# Patient Record
Sex: MALE | Race: WHITE | NOT HISPANIC OR LATINO | Employment: OTHER | URBAN - METROPOLITAN AREA
[De-identification: names, ages, dates, MRNs, and addresses within clinical notes are randomized per-mention and may not be internally consistent; named-entity substitution may affect disease eponyms.]

---

## 2017-12-07 LAB
LDLC SERPL CALC-MCNC: 110 MG/DL (ref 0–99)
VLDLC SERPL CALC-MCNC: 17 MG/DL (ref 5–40)

## 2018-01-09 ENCOUNTER — HISTORICAL (OUTPATIENT)
Dept: RADIOLOGY | Facility: HOSPITAL | Age: 58
End: 2018-01-09

## 2018-01-09 LAB
ALBUMIN SERPL-MCNC: 3.7 GM/DL (ref 3.4–5)
ALP SERPL-CCNC: 50 UNIT/L (ref 46–116)
ALT SERPL-CCNC: 50 UNIT/L (ref 12–78)
AST SERPL-CCNC: 21 UNIT/L (ref 15–37)
BILIRUB SERPL-MCNC: 0.5 MG/DL (ref 0.2–1)
BILIRUBIN DIRECT+TOT PNL SERPL-MCNC: 0.12 MG/DL (ref 0–0.2)
BILIRUBIN DIRECT+TOT PNL SERPL-MCNC: 0.41 MG/DL (ref 0–0.8)
CHOLEST SERPL-MCNC: 165 MG/DL (ref 0–200)
CHOLEST/HDLC SERPL: 3.4 {RATIO} (ref 0–5)
HDLC SERPL-MCNC: 48 MG/DL (ref 40–60)
LDLC SERPL CALC-MCNC: 97 MG/DL (ref 0–129)
PROT SERPL-MCNC: 7.4 GM/DL (ref 6.4–8.2)
TRIGL SERPL-MCNC: 100 MG/DL
VLDLC SERPL CALC-MCNC: 20 MG/DL

## 2018-04-16 LAB
CHOLEST SERPL-MCNC: 206 MG/DL
HDLC SERPL-MCNC: 77 MG/DL (ref 35–60)
LDLC SERPL CALC-MCNC: 115 MG/DL
TRIGL SERPL-MCNC: 68 MG/DL (ref 30–150)

## 2018-12-10 ENCOUNTER — HISTORICAL (OUTPATIENT)
Dept: ADMINISTRATIVE | Facility: HOSPITAL | Age: 58
End: 2018-12-10

## 2018-12-10 LAB
ALBUMIN SERPL-MCNC: 4.6 G/DL (ref 3.6–4.8)
ALBUMIN/GLOB SERPL: 1.5 {RATIO} (ref 1.2–2.2)
ALP SERPL-CCNC: 57 IU/L (ref 39–117)
ALT SERPL-CCNC: 21 IU/L (ref 0–44)
AST SERPL-CCNC: 19 IU/L (ref 0–40)
BASOPHILS # BLD AUTO: 0 X10E3/UL (ref 0–0.2)
BASOPHILS NFR BLD AUTO: 1 %
BILIRUB SERPL-MCNC: 0.4 MG/DL (ref 0–1.2)
BUN SERPL-MCNC: 14 MG/DL (ref 8–27)
CALCIUM SERPL-MCNC: 10.1 MG/DL (ref 8.6–10.2)
CHLORIDE SERPL-SCNC: 99 MMOL/L (ref 96–106)
CHOLEST SERPL-MCNC: 206 MG/DL (ref 100–199)
CHOLEST/HDLC SERPL: 2.6 RATIO (ref 0–5)
CO2 SERPL-SCNC: 28 MMOL/L (ref 20–29)
CREAT SERPL-MCNC: 0.97 MG/DL (ref 0.76–1.27)
CREAT/UREA NIT SERPL: 14 (ref 10–24)
EOSINOPHIL # BLD AUTO: 0.2 X10E3/UL (ref 0–0.4)
EOSINOPHIL NFR BLD AUTO: 3 %
ERYTHROCYTE [DISTWIDTH] IN BLOOD BY AUTOMATED COUNT: 12.9 % (ref 12.3–15.4)
GLOBULIN SER-MCNC: 3.1 G/DL (ref 1.5–4.5)
GLUCOSE SERPL-MCNC: 100 MG/DL (ref 65–99)
HCT VFR BLD AUTO: 45.7 % (ref 37.5–51)
HDLC SERPL-MCNC: 80 MG/DL
HGB BLD-MCNC: 15.2 G/DL (ref 13–17.7)
LDLC SERPL CALC-MCNC: 105 MG/DL (ref 0–99)
LYMPHOCYTES # BLD AUTO: 1.5 X10E3/UL (ref 0.7–3.1)
LYMPHOCYTES NFR BLD AUTO: 26 %
MCH RBC QN AUTO: 32.8 PG (ref 26.6–33)
MCHC RBC AUTO-ENTMCNC: 33.3 G/DL (ref 31.5–35.7)
MCV RBC AUTO: 99 FL (ref 79–97)
MONOCYTES # BLD AUTO: 0.5 X10E3/UL (ref 0.1–0.9)
MONOCYTES NFR BLD AUTO: 10 %
NEUTROPHILS # BLD AUTO: 3.3 X10E3/UL (ref 1.4–7)
NEUTROPHILS NFR BLD AUTO: 60 %
PLATELET # BLD AUTO: 214 X10E3/UL (ref 150–379)
POTASSIUM SERPL-SCNC: 4.6 MMOL/L (ref 3.5–5.2)
PROT SERPL-MCNC: 7.7 G/DL (ref 6–8.5)
PSA SERPL-MCNC: <0.1 NG/ML (ref 0–4)
RBC # BLD AUTO: 4.64 X10(6)/MCL (ref 4.14–5.8)
SODIUM SERPL-SCNC: 143 MMOL/L (ref 134–144)
TRIGL SERPL-MCNC: 103 MG/DL (ref 0–149)
TSH SERPL-ACNC: 1.77 MIU/ML (ref 0.45–4.5)
VLDLC SERPL CALC-MCNC: 21 MG/DL (ref 5–40)
WBC # SPEC AUTO: 5.5 X10E3/UL (ref 3.4–10.8)

## 2019-12-10 ENCOUNTER — HISTORICAL (OUTPATIENT)
Dept: ADMINISTRATIVE | Facility: HOSPITAL | Age: 59
End: 2019-12-10

## 2019-12-10 LAB
ALBUMIN SERPL-MCNC: 4.3 G/DL (ref 3.6–4.8)
ALBUMIN/GLOB SERPL: 1.5 {RATIO} (ref 1.2–2.2)
ALP SERPL-CCNC: 58 IU/L (ref 39–117)
ALT SERPL-CCNC: 19 IU/L (ref 0–44)
AST SERPL-CCNC: 18 IU/L (ref 0–40)
BASOPHILS # BLD AUTO: 0.1 X10E3/UL (ref 0–0.2)
BASOPHILS NFR BLD AUTO: 1 %
BILIRUB SERPL-MCNC: 0.3 MG/DL (ref 0–1.2)
BUN SERPL-MCNC: 14 MG/DL (ref 8–27)
CALCIUM SERPL-MCNC: 9.3 MG/DL (ref 8.6–10.2)
CHLORIDE SERPL-SCNC: 103 MMOL/L (ref 96–106)
CHOLEST SERPL-MCNC: 202 MG/DL (ref 100–199)
CHOLEST/HDLC SERPL: 2.7 RATIO (ref 0–5)
CO2 SERPL-SCNC: 26 MMOL/L (ref 20–29)
CREAT SERPL-MCNC: 1.24 MG/DL (ref 0.76–1.27)
CREAT/UREA NIT SERPL: 11 (ref 10–24)
EOSINOPHIL # BLD AUTO: 0.3 X10E3/UL (ref 0–0.4)
EOSINOPHIL NFR BLD AUTO: 5 %
ERYTHROCYTE [DISTWIDTH] IN BLOOD BY AUTOMATED COUNT: 12.6 % (ref 12.3–15.4)
GLOBULIN SER-MCNC: 2.9 G/DL (ref 1.5–4.5)
GLUCOSE SERPL-MCNC: NORMAL MG/DL
HCT VFR BLD AUTO: 44.1 % (ref 37.5–51)
HDLC SERPL-MCNC: 75 MG/DL
HGB BLD-MCNC: 14.4 G/DL (ref 13–17.7)
LDLC SERPL CALC-MCNC: 99 MG/DL (ref 0–99)
LYMPHOCYTES # BLD AUTO: 1.3 X10E3/UL (ref 0.7–3.1)
LYMPHOCYTES NFR BLD AUTO: 22 %
MCH RBC QN AUTO: 32.1 PG (ref 26.6–33)
MCHC RBC AUTO-ENTMCNC: 32.7 G/DL (ref 31.5–35.7)
MCV RBC AUTO: 98 FL (ref 79–97)
MONOCYTES # BLD AUTO: 0.7 X10E3/UL (ref 0.1–0.9)
MONOCYTES NFR BLD AUTO: 12 %
NEUTROPHILS # BLD AUTO: 3.5 X10E3/UL (ref 1.4–7)
NEUTROPHILS NFR BLD AUTO: 60 %
PLATELET # BLD AUTO: 187 X10E3/UL (ref 150–450)
POTASSIUM SERPL-SCNC: NORMAL MMOL/L
PROT SERPL-MCNC: 7.2 G/DL (ref 6–8.5)
RBC # BLD AUTO: 4.49 X10(6)/MCL (ref 4.14–5.8)
SODIUM SERPL-SCNC: 142 MMOL/L (ref 134–144)
TRIGL SERPL-MCNC: 141 MG/DL (ref 0–149)
TSH SERPL-ACNC: 1.75 MIU/ML (ref 0.45–4.5)
VLDLC SERPL CALC-MCNC: 28 MG/DL (ref 5–40)
WBC # SPEC AUTO: 5.9 X10E3/UL (ref 3.4–10.8)

## 2020-09-17 LAB — CRC RECOMMENDATION EXT: NORMAL

## 2020-12-28 ENCOUNTER — HISTORICAL (OUTPATIENT)
Dept: LAB | Facility: HOSPITAL | Age: 60
End: 2020-12-28

## 2020-12-29 LAB
ABS NEUT (OLG): 2.27 X10(3)/MCL (ref 2.1–9.2)
ALBUMIN SERPL-MCNC: 3.8 GM/DL (ref 3.4–4.8)
ALBUMIN/GLOB SERPL: 1.5 RATIO (ref 1.1–2)
ALP SERPL-CCNC: 48 UNIT/L (ref 40–150)
ALT SERPL-CCNC: 24 UNIT/L (ref 0–55)
AST SERPL-CCNC: 18 UNIT/L (ref 5–34)
BASOPHILS # BLD AUTO: 0 X10(3)/MCL (ref 0–0.2)
BASOPHILS NFR BLD AUTO: 1 %
BILIRUB SERPL-MCNC: 0.7 MG/DL
BILIRUBIN DIRECT+TOT PNL SERPL-MCNC: 0.2 MG/DL (ref 0–0.5)
BILIRUBIN DIRECT+TOT PNL SERPL-MCNC: 0.5 MG/DL (ref 0–0.8)
BUN SERPL-MCNC: 12.8 MG/DL (ref 8.4–25.7)
CALCIUM SERPL-MCNC: 9.1 MG/DL (ref 8.8–10)
CHLORIDE SERPL-SCNC: 107 MMOL/L (ref 98–107)
CHOLEST SERPL-MCNC: 162 MG/DL
CHOLEST/HDLC SERPL: 3 {RATIO} (ref 0–5)
CO2 SERPL-SCNC: 29 MMOL/L (ref 23–31)
CREAT SERPL-MCNC: 1.16 MG/DL (ref 0.73–1.18)
EOSINOPHIL # BLD AUTO: 0.4 X10(3)/MCL (ref 0–0.9)
EOSINOPHIL NFR BLD AUTO: 8 %
ERYTHROCYTE [DISTWIDTH] IN BLOOD BY AUTOMATED COUNT: 11.7 % (ref 11.5–17)
GLOBULIN SER-MCNC: 2.6 GM/DL (ref 2.4–3.5)
GLUCOSE SERPL-MCNC: 94 MG/DL (ref 82–115)
HCT VFR BLD AUTO: 43.2 % (ref 42–52)
HDLC SERPL-MCNC: 48 MG/DL (ref 35–60)
HGB BLD-MCNC: 14.3 GM/DL (ref 14–18)
IMM GRANULOCYTES # BLD AUTO: 0.01 % (ref 0–0.02)
IMM GRANULOCYTES NFR BLD AUTO: 0.2 % (ref 0–0.43)
LDLC SERPL CALC-MCNC: 97 MG/DL (ref 50–140)
LYMPHOCYTES # BLD AUTO: 1.1 X10(3)/MCL (ref 0.6–4.6)
LYMPHOCYTES NFR BLD AUTO: 26 %
MCH RBC QN AUTO: 32.3 PG (ref 27–31)
MCHC RBC AUTO-ENTMCNC: 33.1 GM/DL (ref 33–36)
MCV RBC AUTO: 97.5 FL (ref 80–94)
MONOCYTES # BLD AUTO: 0.5 X10(3)/MCL (ref 0.1–1.3)
MONOCYTES NFR BLD AUTO: 12 %
NEUTROPHILS # BLD AUTO: 2.27 X10(3)/MCL (ref 1.4–7.9)
NEUTROPHILS NFR BLD AUTO: 53 %
PLATELET # BLD AUTO: 158 X10(3)/MCL (ref 130–400)
PMV BLD AUTO: 9.6 FL (ref 9.4–12.4)
POTASSIUM SERPL-SCNC: 5.1 MMOL/L (ref 3.5–5.1)
PROT SERPL-MCNC: 6.4 GM/DL (ref 5.8–7.6)
PSA SERPL-MCNC: <0.02 NG/ML
RBC # BLD AUTO: 4.43 X10(6)/MCL (ref 4.7–6.1)
SODIUM SERPL-SCNC: 143 MMOL/L (ref 136–145)
TRIGL SERPL-MCNC: 84 MG/DL (ref 34–140)
TSH SERPL-ACNC: 1.08 UIU/ML (ref 0.35–4.94)
VLDLC SERPL CALC-MCNC: 17 MG/DL
WBC # SPEC AUTO: 4.3 X10(3)/MCL (ref 4.5–11.5)

## 2022-01-14 ENCOUNTER — HISTORICAL (OUTPATIENT)
Dept: LAB | Facility: HOSPITAL | Age: 62
End: 2022-01-14

## 2022-01-14 LAB
ABS NEUT (OLG): 2.15 X10(3)/MCL (ref 2.1–9.2)
ALBUMIN SERPL-MCNC: 3.7 GM/DL (ref 3.4–4.8)
ALBUMIN/GLOB SERPL: 1.2 RATIO (ref 1.1–2)
ALP SERPL-CCNC: 47 UNIT/L (ref 40–150)
ALT SERPL-CCNC: 25 UNIT/L (ref 0–55)
AST SERPL-CCNC: 19 UNIT/L (ref 5–34)
BASOPHILS # BLD AUTO: 0 X10(3)/MCL (ref 0–0.2)
BASOPHILS NFR BLD AUTO: 1 %
BILIRUB SERPL-MCNC: 0.6 MG/DL
BILIRUBIN DIRECT+TOT PNL SERPL-MCNC: 0.2 MG/DL (ref 0–0.5)
BILIRUBIN DIRECT+TOT PNL SERPL-MCNC: 0.4 MG/DL (ref 0–0.8)
BUN SERPL-MCNC: 13.4 MG/DL (ref 8.4–25.7)
CALCIUM SERPL-MCNC: 9.1 MG/DL (ref 8.7–10.5)
CHLORIDE SERPL-SCNC: 106 MMOL/L (ref 98–107)
CHOLEST SERPL-MCNC: 170 MG/DL
CHOLEST/HDLC SERPL: 3 {RATIO} (ref 0–5)
CO2 SERPL-SCNC: 27 MMOL/L (ref 23–31)
CREAT SERPL-MCNC: 0.85 MG/DL (ref 0.73–1.18)
EOSINOPHIL # BLD AUTO: 0.3 X10(3)/MCL (ref 0–0.9)
EOSINOPHIL NFR BLD AUTO: 7 %
ERYTHROCYTE [DISTWIDTH] IN BLOOD BY AUTOMATED COUNT: 12 % (ref 11.5–17)
GLOBULIN SER-MCNC: 3 GM/DL (ref 2.4–3.5)
GLUCOSE SERPL-MCNC: 80 MG/DL (ref 82–115)
HCT VFR BLD AUTO: 39.3 % (ref 42–52)
HDLC SERPL-MCNC: 50 MG/DL (ref 35–60)
HGB BLD-MCNC: 13.4 GM/DL (ref 14–18)
LDLC SERPL CALC-MCNC: 107 MG/DL (ref 50–140)
LYMPHOCYTES # BLD AUTO: 1.3 X10(3)/MCL (ref 0.6–4.6)
LYMPHOCYTES NFR BLD AUTO: 31 %
MCH RBC QN AUTO: 31.3 PG (ref 27–31)
MCHC RBC AUTO-ENTMCNC: 34.1 GM/DL (ref 33–36)
MCV RBC AUTO: 91.8 FL (ref 80–94)
MONOCYTES # BLD AUTO: 0.5 X10(3)/MCL (ref 0.1–1.3)
MONOCYTES NFR BLD AUTO: 12 %
NEUTROPHILS # BLD AUTO: 2.15 X10(3)/MCL (ref 1.4–7.9)
NEUTROPHILS NFR BLD AUTO: 50 %
PLATELET # BLD AUTO: 173 X10(3)/MCL (ref 130–400)
PMV BLD AUTO: 9.8 FL (ref 9.4–12.4)
POTASSIUM SERPL-SCNC: 4.3 MMOL/L (ref 3.5–5.1)
PROT SERPL-MCNC: 6.7 GM/DL (ref 5.8–7.6)
RBC # BLD AUTO: 4.28 X10(6)/MCL (ref 4.7–6.1)
SODIUM SERPL-SCNC: 142 MMOL/L (ref 136–145)
TRIGL SERPL-MCNC: 67 MG/DL (ref 34–140)
TSH SERPL-ACNC: 1.82 UIU/ML (ref 0.35–4.94)
VLDLC SERPL CALC-MCNC: 13 MG/DL
WBC # SPEC AUTO: 4.3 X10(3)/MCL (ref 4.5–11.5)

## 2022-01-21 ENCOUNTER — HISTORICAL (OUTPATIENT)
Dept: LAB | Facility: HOSPITAL | Age: 62
End: 2022-01-21

## 2022-01-21 LAB
ABS NEUT (OLG): 2.14 X10(3)/MCL (ref 2.1–9.2)
BASOPHILS # BLD AUTO: 0 X10(3)/MCL (ref 0–0.2)
BASOPHILS NFR BLD AUTO: 0 %
EOSINOPHIL # BLD AUTO: 0.2 X10(3)/MCL (ref 0–0.9)
EOSINOPHIL NFR BLD AUTO: 4 %
ERYTHROCYTE [DISTWIDTH] IN BLOOD BY AUTOMATED COUNT: 12.5 % (ref 11.5–17)
FOLATE SERPL-MCNC: 17.4 NG/ML (ref 7–31.4)
HCT VFR BLD AUTO: 42.9 % (ref 42–52)
HEMOCCULT SP1 STL QL: NEGATIVE
HGB BLD-MCNC: 14.3 GM/DL (ref 14–18)
IRON SATN MFR SERPL: 44 % (ref 20–50)
IRON SERPL-MCNC: 118 UG/DL (ref 65–175)
LYMPHOCYTES # BLD AUTO: 1.2 X10(3)/MCL (ref 0.6–4.6)
LYMPHOCYTES NFR BLD AUTO: 30 %
MCH RBC QN AUTO: 31.2 PG (ref 27–31)
MCHC RBC AUTO-ENTMCNC: 33.3 GM/DL (ref 33–36)
MCV RBC AUTO: 93.7 FL (ref 80–94)
MONOCYTES # BLD AUTO: 0.5 X10(3)/MCL (ref 0.1–1.3)
MONOCYTES NFR BLD AUTO: 13 %
NEUTROPHILS # BLD AUTO: 2.14 X10(3)/MCL (ref 1.4–7.9)
NEUTROPHILS NFR BLD AUTO: 53 %
PLATELET # BLD AUTO: 183 X10(3)/MCL (ref 130–400)
PMV BLD AUTO: 9.8 FL (ref 9.4–12.4)
RBC # BLD AUTO: 4.58 X10(6)/MCL (ref 4.7–6.1)
TIBC SERPL-MCNC: 148 UG/DL (ref 69–240)
TIBC SERPL-MCNC: 266 UG/DL (ref 250–450)
TRANSFERRIN SERPL-MCNC: 231 MG/DL (ref 163–344)
VIT B12 SERPL-MCNC: 601 PG/ML (ref 213–816)
WBC # SPEC AUTO: 4 X10(3)/MCL (ref 4.5–11.5)

## 2022-04-10 ENCOUNTER — HISTORICAL (OUTPATIENT)
Dept: ADMINISTRATIVE | Facility: HOSPITAL | Age: 62
End: 2022-04-10
Payer: COMMERCIAL

## 2022-04-26 VITALS
OXYGEN SATURATION: 99 % | WEIGHT: 200.19 LBS | HEIGHT: 70 IN | DIASTOLIC BLOOD PRESSURE: 70 MMHG | SYSTOLIC BLOOD PRESSURE: 132 MMHG | BODY MASS INDEX: 28.66 KG/M2

## 2022-05-03 NOTE — HISTORICAL [facility] CERNER
This is a historical note converted from Emre. Formatting and pictures may have been removed.  Please reference Cernomi for original formatting and attached multimedia. Chief Complaint  Annual  History of Present Illness  This is a 58 year old white male who presents to the clinic today for his annual wellness exam. Patient has a history of HTN, heart murmur, and allergies. States had allergy testing with Dr Blas and is going to begin allergy shots. ?Patient has no complaints today.? States hes doing well with his medications. ?States that  recently changed his blood pressure medication?but he had to start taking a half a tablet daily and has been doing well since then.  Review of Systems  Constitutional: ?Negative except as documented in history of present illness.??  Eye: ?Negative except as documented in history of present illness.??  Ear/Nose/Mouth/Throat: ?Negative except as documented in history of present illness.??  Respiratory: ?Negative except as documented in history of present illness.??  Cardiovascular: ?Negative except as documented in history of present illness.??  Gastrointestinal: ?Negative except as documented in history of present illness.??  Genitourinary: ?Negative except as documented in history of present illness.??  Hematology/Lymphatics: ?Negative except as documented in history of present illness.??  Endocrine: ?Negative except as documented in history of present illness.??  Immunologic: ?Negative except as documented in history of present illness.??  Musculoskeletal: ?Negative except as documented in history of present illness.??  Integumentary: ?Negative except as documented in history of present illness.??  Neurologic: ?Negative except as documented in history of present illness.??  Psychiatric: ?Negative except as documented in history of present illness.??  All other systems are negative  Physical Exam  Vitals & Measurements  HR:?77(Peripheral)? BP:?116/63?  SpO2:?98%?  HT:?182?cm? HT:?182?cm? WT:?86?kg? WT:?86?kg? BMI:?25.96?  General: ?Alert and oriented, No acute distress.??  Eye: ?Pupils are equal, round and reactive to light, Extraocular movements are intact, Normal conjunctiva.??  HENT: ?Normocephalic, No damage to dentition, Tympanic membranes are clear, Good light reflex, Normal hearing, Oral mucosa is moist, No pharyngeal erythema, No sinus tenderness.??  Neck: ?Supple, Non-tender.??  Respiratory: ?Lungs are clear to auscultation, Respirations are non-labored, Breath sounds are equal, Symmetrical chest wall expansion.??  Cardiovascular: ?Normal rate, Regular rhythm,?+ murmur, No edema.??  Gastrointestinal: ?Soft, Non-tender, Non-distended, Normal bowel sounds.??  Genitourinary: ?No costovertebral angle tenderness.??  Musculoskeletal: ?Normal range of motion, Normal strength, No tenderness, No swelling, No deformity, Normal gait.??  Integumentary: ?Warm, Dry, Pink.??  Neurologic: ?Alert, Oriented, Normal sensory, Normal motor function, No focal deficits.??  Cognition and Speech: ?Oriented, Speech clear and coherent, Functional cognition intact.??  Psychiatric: ?Cooperative, Appropriate mood & affect, Normal judgment.  ?????  Assessment/Plan  1.?Annual physical exam?Z00.00  Ordered:  CBC w/ Auto Diff, Routine collect, 12/10/18 16:30:00 CST, Blood, [facility], Stop date 12/10/18 16:30:00 CST, Lab Collect, Annual physical exam, 12/10/18 16:30:00 CST  Comprehensive Metabolic Panel, Routine collect, 12/10/18 16:30:00 CST, Blood, [facility], Stop date 12/10/18 16:30:00 CST, Lab Collect, Annual physical exam, 12/10/18 16:30:00 CST  Lipid Panel, Routine collect, 12/10/18 16:30:00 CST, Blood, [facility], Stop date 12/10/18 16:30:00 CST, Lab Collect, Annual physical exam, 12/10/18 16:30:00 CST  [facility] Health Care Est 40-64 years [id number] , Annual physical exam, [facility], 12/10/18 16:29:00 CST  Thyroid Stimulating Hormone,  Routine collect, 12/10/18 16:30:00 CST, Blood, [facility], Stop date 12/10/18 16:30:00 CST, Lab Collect, Annual physical exam, 12/10/18 16:30:00 CST  ?  2.?Needs flu shot?Z23  ?  3.?Multiple allergies?Z88.9  ?  4.?Prostate cancer screening?Z12.5  Ordered:  Prostate Specific Antigen, Routine collect, 12/10/18 16:30:00 CST, Blood, [facility], Stop date 12/10/18 16:30:00 CST, Lab Collect, Prostate cancer screening, 12/10/18 16:30:00 CST  ?  Orders:  aspirin, 81 mg = 1 tab(s), Oral, Daily, # 30 tab(s), 0 Refill(s), other reason (Rx)  Instructed to start taking baby aspirin daily. ?Patient verbalized understanding.   Problem List/Past Medical History  Ongoing  Heart murmur, systolic  HTN (hypertension)  Multiple allergies  Historical  No qualifying data  Medications  aspirin 81 mg oral tablet, 81 mg= 1 tab(s), Oral, Daily  Flonase 50 mcg/inh nasal spray, 2 spray(s), Nasal, Daily, 5 refills  LISINOPRIL HCTZ 20 25 MG TA, 0.5 tab(s), Oral, Daily  MONTELUKAST SOD 10 MG TAB, 10 mg= 1 tab(s), Oral, Daily  Xyzal 5 mg oral tablet, 5 mg= 1 tab(s), Oral, qPM, 5 refills  Allergies  No Known Medication Allergies  Social History  Tobacco  Never smoker, N/A, 12/10/2018  Never smoker, 12/08/2015  Family History  Family history is negative  Immunizations  Vaccine Date Status Comments   influenza virus vaccine, inactivated 12/10/2018 Given    influenza virus vaccine, inactivated 12/07/2017 Given    influenza virus vaccine, inactivated 12/08/2016 Given Other : ?late   influenza virus vaccine, inactivated 12/08/2015 Given    Health Maintenance  Health Maintenance  ???Pending?(in the next year)  ??? ??OverDue  ??? ? ? ?Diabetes Screening due??and every?  ??? ? ? ?Influenza Vaccine due??and every?  ??? ? ? ?Hypertension Management-BMP due??12/07/18??and every 1??year(s)  ??? ??Due?  ??? ? ? ?Tetanus Vaccine due??12/10/18??and every 10??year(s)  ??? ? ? ?Zoster Vaccine due??12/10/18??and every 100??year(s)  ???Satisfied?(in the past  1 year)  ??? ??Satisfied?  ??? ? ? ?ADL Screening on??12/10/18.??Satisfied by Christianne Viveros  ??? ? ? ?Alcohol Misuse Screening on??12/10/18.??Satisfied by Christianne Viveros  ??? ? ? ?Aspirin Therapy for CVD Prevention on??12/10/18.??Satisfied by Christianne Viveros  ??? ? ? ?Blood Pressure Screening on??12/10/18.??Satisfied by Rabia KISER, Pita  ??? ? ? ?Body Mass Index Check on??12/10/18.??Satisfied by Rabia KISER, Pita  ??? ? ? ?Depression Screening on??12/10/18.??Satisfied by Rabia KISER, Pita  ??? ? ? ?Hypertension Management-Education on??12/10/18.??Satisfied by Christianne Viveros  ??? ? ? ?Hypertension Maintenance-Medication Prescribed on??12/10/18.??Satisfied by Christianne Viveros  ??? ? ? ?Hypertension Management-Blood Pressure on??12/10/18.??Satisfied by Benintgarricke RASHEL, Pita  ??? ? ? ?Influenza Vaccine on??12/10/18.??Satisfied by Benintende RASHEL, Pita  ??? ? ? ?Lipid Screening on??04/16/18.??Satisfied by Yvette Moore  ??? ? ? ?Obesity Screening on??12/10/18.??Satisfied by Benricharde RASHEL, Pita  ??? ? ? ?Smoking Cessation on??12/10/18.??Satisfied by Christianne Viveros  ?  ?

## 2022-09-19 ENCOUNTER — HISTORICAL (OUTPATIENT)
Dept: ADMINISTRATIVE | Facility: HOSPITAL | Age: 62
End: 2022-09-19
Payer: COMMERCIAL

## 2023-01-11 ENCOUNTER — TELEPHONE (OUTPATIENT)
Dept: FAMILY MEDICINE | Facility: CLINIC | Age: 63
End: 2023-01-11
Payer: COMMERCIAL

## 2023-01-11 ENCOUNTER — DOCUMENTATION ONLY (OUTPATIENT)
Dept: FAMILY MEDICINE | Facility: CLINIC | Age: 63
End: 2023-01-11
Payer: COMMERCIAL

## 2023-01-11 DIAGNOSIS — Z00.00 ANNUAL PHYSICAL EXAM: Primary | ICD-10-CM

## 2023-01-11 DIAGNOSIS — Z11.59 NEED FOR HEPATITIS C SCREENING TEST: ICD-10-CM

## 2023-01-11 DIAGNOSIS — C61 PROSTATE CANCER: ICD-10-CM

## 2023-01-11 DIAGNOSIS — Z11.4 SCREENING FOR HIV (HUMAN IMMUNODEFICIENCY VIRUS): ICD-10-CM

## 2023-01-11 PROBLEM — Z88.9 PREDISPOSITION TO ALLERGIC REACTION: Status: ACTIVE | Noted: 2023-01-11

## 2023-01-11 PROBLEM — D64.9 ANEMIA: Status: ACTIVE | Noted: 2023-01-11

## 2023-01-11 PROBLEM — Z85.46 HISTORY OF PROSTATE CANCER: Status: RESOLVED | Noted: 2023-01-11 | Resolved: 2023-01-11

## 2023-01-11 PROBLEM — I35.0 MILD AORTIC VALVE STENOSIS: Status: ACTIVE | Noted: 2023-01-11

## 2023-01-11 PROBLEM — R01.1 SYSTOLIC MURMUR: Status: ACTIVE | Noted: 2023-01-11

## 2023-01-11 PROBLEM — I10 HYPERTENSION: Status: ACTIVE | Noted: 2023-01-11

## 2023-01-11 PROBLEM — Z85.46 HISTORY OF PROSTATE CANCER: Status: ACTIVE | Noted: 2023-01-11

## 2023-01-11 NOTE — TELEPHONE ENCOUNTER
Are there any outstanding tasks in patient's chart?  n    2. Do we have outstanding/pending referrals?  n    3. Has the patient been seen in an ER, Urgent Care, or admitted since last visit?  n    4. Has patient seen any other health care providers since last visit?  n    5.  Has patient had any blood work or x-rays done since last visit?   Will complete labs at [facility]    Please order wellness labs needed at [facility]

## 2023-06-26 ENCOUNTER — TELEPHONE (OUTPATIENT)
Dept: FAMILY MEDICINE | Facility: CLINIC | Age: 63
End: 2023-06-26
Payer: COMMERCIAL

## 2023-06-26 NOTE — TELEPHONE ENCOUNTER
Patient called and scheduled a wellness visit on 6/23/23.  He was notified to complete labs prior to his visit.  Lab orders are in system.

## 2023-07-06 ENCOUNTER — LAB VISIT (OUTPATIENT)
Dept: LAB | Facility: HOSPITAL | Age: 63
End: 2023-07-06
Attending: NURSE PRACTITIONER
Payer: COMMERCIAL

## 2023-07-06 DIAGNOSIS — Z11.59 NEED FOR HEPATITIS C SCREENING TEST: ICD-10-CM

## 2023-07-06 DIAGNOSIS — Z11.4 SCREENING FOR HIV (HUMAN IMMUNODEFICIENCY VIRUS): ICD-10-CM

## 2023-07-06 DIAGNOSIS — Z00.00 ANNUAL PHYSICAL EXAM: ICD-10-CM

## 2023-07-06 DIAGNOSIS — Z12.5 PROSTATE CANCER SCREENING: ICD-10-CM

## 2023-07-06 LAB
ALBUMIN SERPL-MCNC: 3.7 G/DL (ref 3.4–4.8)
ALBUMIN/GLOB SERPL: 1 RATIO (ref 1.1–2)
ALP SERPL-CCNC: 60 UNIT/L (ref 40–150)
ALT SERPL-CCNC: 24 UNIT/L (ref 0–55)
AST SERPL-CCNC: 21 UNIT/L (ref 5–34)
BASOPHILS # BLD AUTO: 0.02 X10(3)/MCL
BASOPHILS NFR BLD AUTO: 0.6 %
BILIRUBIN DIRECT+TOT PNL SERPL-MCNC: 0.7 MG/DL
BUN SERPL-MCNC: 9.5 MG/DL (ref 8.4–25.7)
CALCIUM SERPL-MCNC: 9.4 MG/DL (ref 8.8–10)
CHLORIDE SERPL-SCNC: 102 MMOL/L (ref 98–107)
CHOLEST SERPL-MCNC: 197 MG/DL
CHOLEST/HDLC SERPL: 3 {RATIO} (ref 0–5)
CO2 SERPL-SCNC: 29 MMOL/L (ref 23–31)
CREAT SERPL-MCNC: 0.96 MG/DL (ref 0.73–1.18)
EOSINOPHIL # BLD AUTO: 0.25 X10(3)/MCL (ref 0–0.9)
EOSINOPHIL NFR BLD AUTO: 7 %
ERYTHROCYTE [DISTWIDTH] IN BLOOD BY AUTOMATED COUNT: 12.3 % (ref 11.5–17)
EST. AVERAGE GLUCOSE BLD GHB EST-MCNC: 99.7 MG/DL
GFR SERPLBLD CREATININE-BSD FMLA CKD-EPI: >60 MLS/MIN/1.73/M2
GLOBULIN SER-MCNC: 3.6 GM/DL (ref 2.4–3.5)
GLUCOSE SERPL-MCNC: 107 MG/DL (ref 82–115)
HBA1C MFR BLD: 5.1 %
HCT VFR BLD AUTO: 45.4 % (ref 42–52)
HCV AB SERPL QL IA: NONREACTIVE
HDLC SERPL-MCNC: 61 MG/DL (ref 35–60)
HGB BLD-MCNC: 14.8 G/DL (ref 14–18)
HIV 1+2 AB+HIV1 P24 AG SERPL QL IA: NONREACTIVE
IMM GRANULOCYTES # BLD AUTO: 0.01 X10(3)/MCL (ref 0–0.04)
IMM GRANULOCYTES NFR BLD AUTO: 0.3 %
LDLC SERPL CALC-MCNC: 122 MG/DL (ref 50–140)
LYMPHOCYTES # BLD AUTO: 0.81 X10(3)/MCL (ref 0.6–4.6)
LYMPHOCYTES NFR BLD AUTO: 22.8 %
MCH RBC QN AUTO: 30.9 PG (ref 27–31)
MCHC RBC AUTO-ENTMCNC: 32.6 G/DL (ref 33–36)
MCV RBC AUTO: 94.8 FL (ref 80–94)
MONOCYTES # BLD AUTO: 0.4 X10(3)/MCL (ref 0.1–1.3)
MONOCYTES NFR BLD AUTO: 11.3 %
NEUTROPHILS # BLD AUTO: 2.06 X10(3)/MCL (ref 2.1–9.2)
NEUTROPHILS NFR BLD AUTO: 58 %
PLATELET # BLD AUTO: 169 X10(3)/MCL (ref 130–400)
PMV BLD AUTO: 9.2 FL (ref 7.4–10.4)
POTASSIUM SERPL-SCNC: 4.3 MMOL/L (ref 3.5–5.1)
PROT SERPL-MCNC: 7.3 GM/DL (ref 5.8–7.6)
PSA SERPL-MCNC: 0.02 NG/ML
RBC # BLD AUTO: 4.79 X10(6)/MCL (ref 4.7–6.1)
SODIUM SERPL-SCNC: 137 MMOL/L (ref 136–145)
TRIGL SERPL-MCNC: 69 MG/DL (ref 34–140)
TSH SERPL-ACNC: 1.12 UIU/ML (ref 0.35–4.94)
VLDLC SERPL CALC-MCNC: 14 MG/DL
WBC # SPEC AUTO: 3.55 X10(3)/MCL (ref 4.5–11.5)

## 2023-07-06 PROCEDURE — 84153 ASSAY OF PSA TOTAL: CPT

## 2023-07-06 PROCEDURE — 87389 HIV-1 AG W/HIV-1&-2 AB AG IA: CPT

## 2023-07-06 PROCEDURE — 36415 COLL VENOUS BLD VENIPUNCTURE: CPT

## 2023-07-06 PROCEDURE — 86803 HEPATITIS C AB TEST: CPT

## 2023-07-06 PROCEDURE — 83036 HEMOGLOBIN GLYCOSYLATED A1C: CPT

## 2023-07-06 PROCEDURE — 80053 COMPREHEN METABOLIC PANEL: CPT

## 2023-07-06 PROCEDURE — 85025 COMPLETE CBC W/AUTO DIFF WBC: CPT

## 2023-07-06 PROCEDURE — 80061 LIPID PANEL: CPT

## 2023-07-06 PROCEDURE — 84443 ASSAY THYROID STIM HORMONE: CPT

## 2023-07-11 ENCOUNTER — OFFICE VISIT (OUTPATIENT)
Dept: FAMILY MEDICINE | Facility: CLINIC | Age: 63
End: 2023-07-11
Payer: COMMERCIAL

## 2023-07-11 VITALS
TEMPERATURE: 98 F | DIASTOLIC BLOOD PRESSURE: 68 MMHG | SYSTOLIC BLOOD PRESSURE: 128 MMHG | OXYGEN SATURATION: 97 % | BODY MASS INDEX: 27.46 KG/M2 | HEART RATE: 77 BPM | RESPIRATION RATE: 16 BRPM | WEIGHT: 191.81 LBS | HEIGHT: 70 IN

## 2023-07-11 DIAGNOSIS — Z11.4 SCREENING FOR HIV (HUMAN IMMUNODEFICIENCY VIRUS): ICD-10-CM

## 2023-07-11 DIAGNOSIS — Z13.6 ENCOUNTER FOR SCREENING FOR ABDOMINAL AORTIC ANEURYSM (AAA) IN PATIENT 50 YEARS OF AGE OR OLDER WITH HISTORY OF SMOKING: ICD-10-CM

## 2023-07-11 DIAGNOSIS — Z87.891 ENCOUNTER FOR SCREENING FOR ABDOMINAL AORTIC ANEURYSM (AAA) IN PATIENT 50 YEARS OF AGE OR OLDER WITH HISTORY OF SMOKING: ICD-10-CM

## 2023-07-11 DIAGNOSIS — Z00.00 ANNUAL PHYSICAL EXAM: Primary | ICD-10-CM

## 2023-07-11 DIAGNOSIS — I35.0 MILD AORTIC VALVE STENOSIS: ICD-10-CM

## 2023-07-11 DIAGNOSIS — I10 PRIMARY HYPERTENSION: ICD-10-CM

## 2023-07-11 DIAGNOSIS — Z23 NEED FOR SHINGLES VACCINE: ICD-10-CM

## 2023-07-11 DIAGNOSIS — Z23 NEED FOR PNEUMOCOCCAL VACCINATION: ICD-10-CM

## 2023-07-11 DIAGNOSIS — Z12.5 PROSTATE CANCER SCREENING: ICD-10-CM

## 2023-07-11 DIAGNOSIS — C61 PROSTATE CANCER: ICD-10-CM

## 2023-07-11 DIAGNOSIS — Z11.59 NEED FOR HEPATITIS C SCREENING TEST: ICD-10-CM

## 2023-07-11 PROCEDURE — 1159F MED LIST DOCD IN RCRD: CPT | Mod: CPTII,,, | Performed by: NURSE PRACTITIONER

## 2023-07-11 PROCEDURE — 99397 PER PM REEVAL EST PAT 65+ YR: CPT | Mod: 25,,, | Performed by: NURSE PRACTITIONER

## 2023-07-11 PROCEDURE — 90471 PNEUMOCOCCAL CONJUGATE VACCINE 20-VALENT: ICD-10-PCS | Mod: ,,, | Performed by: NURSE PRACTITIONER

## 2023-07-11 PROCEDURE — 3074F PR MOST RECENT SYSTOLIC BLOOD PRESSURE < 130 MM HG: ICD-10-PCS | Mod: CPTII,,, | Performed by: NURSE PRACTITIONER

## 2023-07-11 PROCEDURE — 90677 PNEUMOCOCCAL CONJUGATE VACCINE 20-VALENT: ICD-10-PCS | Mod: ,,, | Performed by: NURSE PRACTITIONER

## 2023-07-11 PROCEDURE — 4010F PR ACE/ARB THEARPY RXD/TAKEN: ICD-10-PCS | Mod: CPTII,,, | Performed by: NURSE PRACTITIONER

## 2023-07-11 PROCEDURE — 90677 PCV20 VACCINE IM: CPT | Mod: ,,, | Performed by: NURSE PRACTITIONER

## 2023-07-11 PROCEDURE — 3008F PR BODY MASS INDEX (BMI) DOCUMENTED: ICD-10-PCS | Mod: CPTII,,, | Performed by: NURSE PRACTITIONER

## 2023-07-11 PROCEDURE — 99397 PR PREVENTIVE VISIT,EST,65 & OVER: ICD-10-PCS | Mod: 25,,, | Performed by: NURSE PRACTITIONER

## 2023-07-11 PROCEDURE — 1159F PR MEDICATION LIST DOCUMENTED IN MEDICAL RECORD: ICD-10-PCS | Mod: CPTII,,, | Performed by: NURSE PRACTITIONER

## 2023-07-11 PROCEDURE — 1160F RVW MEDS BY RX/DR IN RCRD: CPT | Mod: CPTII,,, | Performed by: NURSE PRACTITIONER

## 2023-07-11 PROCEDURE — 90471 IMMUNIZATION ADMIN: CPT | Mod: ,,, | Performed by: NURSE PRACTITIONER

## 2023-07-11 PROCEDURE — 3078F DIAST BP <80 MM HG: CPT | Mod: CPTII,,, | Performed by: NURSE PRACTITIONER

## 2023-07-11 PROCEDURE — 3008F BODY MASS INDEX DOCD: CPT | Mod: CPTII,,, | Performed by: NURSE PRACTITIONER

## 2023-07-11 PROCEDURE — 3074F SYST BP LT 130 MM HG: CPT | Mod: CPTII,,, | Performed by: NURSE PRACTITIONER

## 2023-07-11 PROCEDURE — 3078F PR MOST RECENT DIASTOLIC BLOOD PRESSURE < 80 MM HG: ICD-10-PCS | Mod: CPTII,,, | Performed by: NURSE PRACTITIONER

## 2023-07-11 PROCEDURE — 4010F ACE/ARB THERAPY RXD/TAKEN: CPT | Mod: CPTII,,, | Performed by: NURSE PRACTITIONER

## 2023-07-11 PROCEDURE — 1160F PR REVIEW ALL MEDS BY PRESCRIBER/CLIN PHARMACIST DOCUMENTED: ICD-10-PCS | Mod: CPTII,,, | Performed by: NURSE PRACTITIONER

## 2023-07-11 RX ORDER — EPINEPHRINE 0.3 MG/.3ML
INJECTION SUBCUTANEOUS
COMMUNITY
Start: 2023-03-30

## 2023-07-11 RX ORDER — LISINOPRIL AND HYDROCHLOROTHIAZIDE 20; 25 MG/1; MG/1
1 TABLET ORAL DAILY
Qty: 90 TABLET | Refills: 3 | Status: SHIPPED | OUTPATIENT
Start: 2023-07-11 | End: 2024-07-10

## 2023-07-11 RX ORDER — ZOSTER VACCINE RECOMBINANT, ADJUVANTED 50 MCG/0.5
0.5 KIT INTRAMUSCULAR ONCE
Qty: 1 EACH | Refills: 1 | Status: SHIPPED | OUTPATIENT
Start: 2023-07-11 | End: 2023-07-11

## 2023-07-11 NOTE — ASSESSMENT & PLAN NOTE
Last echocardiogram completed in 2018, repeat echocardiogram ordered to be completed at [facility], will call with results.

## 2023-07-11 NOTE — PROGRESS NOTES
Subjective:       Patient ID: Amadeo Hutchins is a 63 y.o. male.    Chief Complaint: Annual Exam      HPI   This is a 63-year-old white male who presents to clinic today for an annual wellness exam.  Patient has an active problem was which includes hypertension, systolic murmur, mild aortic stenosis, prostate cancer, anemia.  Patient states overall he is doing well.  He has no complaints today.  Review of Systems  Comprehensive review of systems negative except as stated in HPI    The patient's Health Maintenance was reviewed and the following appears to be due:   Health Maintenance Due   Topic Date Due    Pneumococcal Vaccines (Age 65+) (1 - PCV) Never done    Shingles Vaccine (1 of 2) Never done    Abdominal Aortic Aneurysm Screening  Never done       Past Medical History:  Past Medical History:   Diagnosis Date    Allergy     Anemia     Hypertension     Mild aortic valve stenosis     Prostate cancer     Systolic murmur      History reviewed. No pertinent surgical history.  Review of patient's allergies indicates:  No Known Allergies  Current Outpatient Medications on File Prior to Visit   Medication Sig Dispense Refill    EPINEPHrine (EPIPEN) 0.3 mg/0.3 mL AtIn SMARTSI pre-filled pen syringe IM Daily PRN      UNABLE TO FIND once a week. medication name: Allergy Shot      [DISCONTINUED] lisinopriL-hydrochlorothiazide (PRINZIDE,ZESTORETIC) 20-25 mg Tab Take 1 tablet by mouth once daily. 30 tablet 0     No current facility-administered medications on file prior to visit.     Social History     Socioeconomic History    Marital status:    Tobacco Use    Smoking status: Former     Types: Cigarettes     Start date:      Quit date:      Years since quittin.5    Smokeless tobacco: Never   Substance and Sexual Activity    Alcohol use: Yes     Alcohol/week: 15.0 standard drinks     Types: 15 Cans of beer per week     Comment: 2-3 beers 4-5 days weekly    Drug use: Not Currently     Types: Marijuana  "    History reviewed. No pertinent family history.    Objective:       /68 (BP Location: Left arm)   Pulse 77   Temp 98.3 °F (36.8 °C) (Oral)   Resp 16   Ht 5' 10" (1.778 m)   Wt 87 kg (191 lb 12.8 oz)   SpO2 97%   BMI 27.52 kg/m²      Physical Exam  Vitals and nursing note reviewed.   Constitutional:       Appearance: Normal appearance. He is normal weight.   HENT:      Head: Normocephalic and atraumatic.      Right Ear: Tympanic membrane, ear canal and external ear normal.      Left Ear: Tympanic membrane, ear canal and external ear normal.      Nose: Nose normal.      Mouth/Throat:      Mouth: Mucous membranes are moist.      Pharynx: Oropharynx is clear.   Eyes:      Extraocular Movements: Extraocular movements intact.      Conjunctiva/sclera: Conjunctivae normal.      Pupils: Pupils are equal, round, and reactive to light.   Cardiovascular:      Rate and Rhythm: Normal rate and regular rhythm.      Heart sounds: Murmur heard.   Pulmonary:      Effort: Pulmonary effort is normal.      Breath sounds: Normal breath sounds.   Abdominal:      General: Abdomen is flat. Bowel sounds are normal.      Palpations: Abdomen is soft.   Musculoskeletal:         General: Normal range of motion.      Cervical back: Normal range of motion and neck supple.   Skin:     General: Skin is warm and dry.   Neurological:      General: No focal deficit present.      Mental Status: He is alert and oriented to person, place, and time.   Psychiatric:         Mood and Affect: Mood normal.         Behavior: Behavior normal.         Thought Content: Thought content normal.         Judgment: Judgment normal.       Labs  Lab Visit on 07/06/2023   Component Date Value Ref Range Status    Sodium Level 07/06/2023 137  136 - 145 mmol/L Final    Potassium Level 07/06/2023 4.3  3.5 - 5.1 mmol/L Final    Chloride 07/06/2023 102  98 - 107 mmol/L Final    Carbon Dioxide 07/06/2023 29  23 - 31 mmol/L Final    Glucose Level 07/06/2023 107  82 " - 115 mg/dL Final    Blood Urea Nitrogen 07/06/2023 9.5  8.4 - 25.7 mg/dL Final    Creatinine 07/06/2023 0.96  0.73 - 1.18 mg/dL Final    Calcium Level Total 07/06/2023 9.4  8.8 - 10.0 mg/dL Final    Protein Total 07/06/2023 7.3  5.8 - 7.6 gm/dL Final    Albumin Level 07/06/2023 3.7  3.4 - 4.8 g/dL Final    Globulin 07/06/2023 3.6 (H)  2.4 - 3.5 gm/dL Final    Albumin/Globulin Ratio 07/06/2023 1.0 (L)  1.1 - 2.0 ratio Final    Bilirubin Total 07/06/2023 0.7  <=1.5 mg/dL Final    Alkaline Phosphatase 07/06/2023 60  40 - 150 unit/L Final    Alanine Aminotransferase 07/06/2023 24  0 - 55 unit/L Final    Aspartate Aminotransferase 07/06/2023 21  5 - 34 unit/L Final    eGFR 07/06/2023 >60  mls/min/1.73/m2 Final    Cholesterol Total 07/06/2023 197  <=200 mg/dL Final    HDL Cholesterol 07/06/2023 61 (H)  35 - 60 mg/dL Final    Triglyceride 07/06/2023 69  34 - 140 mg/dL Final    Cholesterol/HDL Ratio 07/06/2023 3  0 - 5 Final    Very Low Density Lipoprotein 07/06/2023 14   Final    LDL Cholesterol 07/06/2023 122.00  50.00 - 140.00 mg/dL Final    Thyroid Stimulating Hormone 07/06/2023 1.116  0.350 - 4.940 uIU/mL Final    Hemoglobin A1c 07/06/2023 5.1  <=7.0 % Final    Estimated Average Glucose 07/06/2023 99.7  mg/dL Final    Prostate Specific Antigen 07/06/2023 0.02  <=4.00 ng/mL Final    Hep C Ab Interp 07/06/2023 Nonreactive  Nonreactive Final    HIV 07/06/2023 Nonreactive  Nonreactive Final    WBC 07/06/2023 3.55 (L)  4.50 - 11.50 x10(3)/mcL Final    RBC 07/06/2023 4.79  4.70 - 6.10 x10(6)/mcL Final    Hgb 07/06/2023 14.8  14.0 - 18.0 g/dL Final    Hct 07/06/2023 45.4  42.0 - 52.0 % Final    MCV 07/06/2023 94.8 (H)  80.0 - 94.0 fL Final    MCH 07/06/2023 30.9  27.0 - 31.0 pg Final    MCHC 07/06/2023 32.6 (L)  33.0 - 36.0 g/dL Final    RDW 07/06/2023 12.3  11.5 - 17.0 % Final    Platelet 07/06/2023 169  130 - 400 x10(3)/mcL Final    MPV 07/06/2023 9.2  7.4 - 10.4 fL Final    Neut % 07/06/2023 58.0  % Final    Lymph %  07/06/2023 22.8  % Final    Mono % 07/06/2023 11.3  % Final    Eos % 07/06/2023 7.0  % Final    Basophil % 07/06/2023 0.6  % Final    Lymph # 07/06/2023 0.81  0.6 - 4.6 x10(3)/mcL Final    Neut # 07/06/2023 2.06 (L)  2.1 - 9.2 x10(3)/mcL Final    Mono # 07/06/2023 0.40  0.1 - 1.3 x10(3)/mcL Final    Eos # 07/06/2023 0.25  0 - 0.9 x10(3)/mcL Final    Baso # 07/06/2023 0.02  <=0.2 x10(3)/mcL Final    IG# 07/06/2023 0.01  0 - 0.04 x10(3)/mcL Final    IG% 07/06/2023 0.3  % Final       Assessment and Plan       ICD-10-CM ICD-9-CM   1. Annual physical exam  Z00.00 V70.0   2. Need for hepatitis C screening test  Z11.59 V73.89   3. Screening for HIV (human immunodeficiency virus)  Z11.4 V73.89   4. Prostate cancer screening  Z12.5 V76.44   5. Primary hypertension  I10 401.9   6. Mild aortic valve stenosis  I35.0 424.1   7. Need for pneumococcal vaccination  Z23 V03.82   8. Need for shingles vaccine  Z23 V04.89   9. Prostate cancer  C61 185   10. Encounter for screening for abdominal aortic aneurysm (AAA) in patient 50 years of age or older with history of smoking  Z13.6 V81.2    Z87.891 V15.82        1. Annual physical exam  Overview:  Annual exam yearly in July      2. Need for hepatitis C screening test  Comments:  Nonreactive    3. Screening for HIV (human immunodeficiency virus)  Comments:  Nonreactive    4. Prostate cancer screening  Overview:  Followed by Dr. Tobar  PSA yearly    Assessment & Plan:  PSA 0.02, follow-up with Dr. Tobar in 1 year.      5. Primary hypertension  Overview:  Lisinopril/HCTZ 20/25 mg daily    Assessment & Plan:  Stable, continue lisinopril/HCTZ, follow-up 1 year.    Orders:  -     lisinopriL-hydrochlorothiazide (PRINZIDE,ZESTORETIC) 20-25 mg Tab; Take 1 tablet by mouth once daily.  Dispense: 90 tablet; Refill: 3    6. Mild aortic valve stenosis  Overview:  Previously followed by Dr. Cortez  01/09/2018 - TTE - calcification of the right non coronary aortic valve leaflets without aortic  stenosis, aortic valve appears to be tricuspid    Assessment & Plan:  Last echocardiogram completed in 2018, repeat echocardiogram ordered to be completed at [facility], will call with results.    Orders:  -     Echo; Future; Expected date: 07/12/2023    7. Need for pneumococcal vaccination  -     (In Office Administered) Pneumococcal Conjugate Vaccine (20 Valent) (IM)    8. Need for shingles vaccine  -     varicella-zoster [id number], PF, (SHINGRIX, PF,) 50 mcg/0.5 mL injection; Inject 0.5 mLs into the muscle once. for 1 dose  Dispense: 1 each; Refill: 1    9. Prostate cancer  Overview:  Dr. Tobar  Diagnosed 12/11/2014, PSA at time of diagnosis 9.79  02/18/2015 - radical prostatectomy    Assessment & Plan:  Continue yearly follow-up and surveillance with Dr. Tobar.      10. Encounter for screening for abdominal aortic aneurysm (AAA) in patient 50 years of age or older with history of smoking  -      AAA Screening; Future; Expected date: 07/11/2023           Follow up in about 1 year (around 7/11/2024) for Annual.

## 2023-07-14 ENCOUNTER — HOSPITAL ENCOUNTER (OUTPATIENT)
Dept: CARDIOLOGY | Facility: HOSPITAL | Age: 63
Discharge: HOME | End: 2023-07-14
Attending: NURSE PRACTITIONER
Payer: COMMERCIAL

## 2023-07-14 DIAGNOSIS — I35.0 MILD AORTIC VALVE STENOSIS: ICD-10-CM

## 2023-07-14 LAB
AV INDEX (PROSTH): 0.32
AV MEAN GRADIENT: 3 MMHG
AV PEAK GRADIENT: 46 MMHG
AV VALVE AREA: 0.91 CM2
AV VELOCITY RATIO: 0.29
CV ECHO LV RWT: 0.47 CM
DOP CALC AO PEAK VEL: 3.4 M/S
DOP CALC AO VTI: 77.39 CM
DOP CALC LVOT AREA: 2.8 CM2
DOP CALC LVOT DIAMETER: 1.9 CM
DOP CALC LVOT PEAK VEL: 1 M/S
DOP CALC LVOT STROKE VOLUME: 70.7 CM3
DOP CALCLVOT PEAK VEL VTI: 24.95 CM
E/A RATIO: 0.82
ECHO LV POSTERIOR WALL: 1.1 CM (ref 0.6–1.1)
EJECTION FRACTION: 65 %
FRACTIONAL SHORTENING: 36 % (ref 28–44)
INTERVENTRICULAR SEPTUM: 1.1 CM (ref 0.6–1.1)
LEFT ATRIUM SIZE: 3.2 CM
LEFT INTERNAL DIMENSION IN SYSTOLE: 3 CM (ref 2.1–4)
LEFT VENTRICULAR INTERNAL DIMENSION IN DIASTOLE: 4.7 CM (ref 3.5–6)
LEFT VENTRICULAR MASS: 187.54 G
LVOT MV: 0.65 CM/S
MV PEAK A VEL: 0.98 M/S
MV PEAK E VEL: 0.8 M/S
RA PRESSURE: 3 MMHG

## 2023-07-14 PROCEDURE — 93306 TTE W/DOPPLER COMPLETE: CPT

## 2023-07-17 ENCOUNTER — TELEPHONE (OUTPATIENT)
Dept: FAMILY MEDICINE | Facility: CLINIC | Age: 63
End: 2023-07-17
Payer: COMMERCIAL

## 2023-07-17 DIAGNOSIS — I35.0 NONRHEUMATIC AORTIC VALVE STENOSIS: Primary | ICD-10-CM

## 2023-07-17 NOTE — TELEPHONE ENCOUNTER
----- Message from PARISH Ba sent at 7/17/2023 12:55 PM CDT -----  Echocardiogram shows moderate to severe aortic stenosis. In 2018, he had calcification of the valve but no stenosis. He needs referral to cardiology for follow up. I will refer back to [facility] in [place]

## 2023-07-17 NOTE — PROGRESS NOTES
Echocardiogram shows moderate to severe aortic stenosis. In 2018, he had calcification of the valve but no stenosis. He needs referral to cardiology for follow up. I will refer back to [facility] in [place]

## 2023-07-18 ENCOUNTER — HOSPITAL ENCOUNTER (OUTPATIENT)
Dept: RADIOLOGY | Facility: HOSPITAL | Age: 63
Discharge: HOME | End: 2023-07-18
Attending: NURSE PRACTITIONER
Payer: COMMERCIAL

## 2023-07-18 ENCOUNTER — TELEPHONE (OUTPATIENT)
Dept: FAMILY MEDICINE | Facility: CLINIC | Age: 63
End: 2023-07-18
Payer: COMMERCIAL

## 2023-07-18 DIAGNOSIS — Z13.6 ENCOUNTER FOR SCREENING FOR ABDOMINAL AORTIC ANEURYSM (AAA) IN PATIENT 50 YEARS OF AGE OR OLDER WITH HISTORY OF SMOKING: ICD-10-CM

## 2023-07-18 DIAGNOSIS — Z87.891 ENCOUNTER FOR SCREENING FOR ABDOMINAL AORTIC ANEURYSM (AAA) IN PATIENT 50 YEARS OF AGE OR OLDER WITH HISTORY OF SMOKING: ICD-10-CM

## 2023-07-18 PROCEDURE — 76706 US ABDL AORTA SCREEN AAA: CPT | Mod: TC

## 2023-10-16 PROBLEM — Z00.00 ANNUAL PHYSICAL EXAM: Status: RESOLVED | Noted: 2023-07-11 | Resolved: 2023-10-16

## 2024-02-22 DIAGNOSIS — I35.0 AORTIC VALVE STENOSIS: Primary | ICD-10-CM

## 2024-03-13 ENCOUNTER — OFFICE VISIT (OUTPATIENT)
Dept: CARDIAC SURGERY | Facility: CLINIC | Age: 64
End: 2024-03-13
Payer: MEDICARE

## 2024-03-13 VITALS
BODY MASS INDEX: 27.75 KG/M2 | HEART RATE: 71 BPM | DIASTOLIC BLOOD PRESSURE: 79 MMHG | OXYGEN SATURATION: 99 % | HEIGHT: 70 IN | SYSTOLIC BLOOD PRESSURE: 139 MMHG | WEIGHT: 193.81 LBS

## 2024-03-13 DIAGNOSIS — I35.0 AORTIC VALVE STENOSIS: ICD-10-CM

## 2024-03-13 PROCEDURE — 3288F FALL RISK ASSESSMENT DOCD: CPT | Mod: CPTII,,, | Performed by: THORACIC SURGERY (CARDIOTHORACIC VASCULAR SURGERY)

## 2024-03-13 PROCEDURE — 1159F MED LIST DOCD IN RCRD: CPT | Mod: CPTII,,, | Performed by: THORACIC SURGERY (CARDIOTHORACIC VASCULAR SURGERY)

## 2024-03-13 PROCEDURE — 1126F AMNT PAIN NOTED NONE PRSNT: CPT | Mod: CPTII,,, | Performed by: THORACIC SURGERY (CARDIOTHORACIC VASCULAR SURGERY)

## 2024-03-13 PROCEDURE — 3008F BODY MASS INDEX DOCD: CPT | Mod: CPTII,,, | Performed by: THORACIC SURGERY (CARDIOTHORACIC VASCULAR SURGERY)

## 2024-03-13 PROCEDURE — 3075F SYST BP GE 130 - 139MM HG: CPT | Mod: CPTII,,, | Performed by: THORACIC SURGERY (CARDIOTHORACIC VASCULAR SURGERY)

## 2024-03-13 PROCEDURE — 4010F ACE/ARB THERAPY RXD/TAKEN: CPT | Mod: CPTII,,, | Performed by: THORACIC SURGERY (CARDIOTHORACIC VASCULAR SURGERY)

## 2024-03-13 PROCEDURE — 1101F PT FALLS ASSESS-DOCD LE1/YR: CPT | Mod: CPTII,,, | Performed by: THORACIC SURGERY (CARDIOTHORACIC VASCULAR SURGERY)

## 2024-03-13 PROCEDURE — 3078F DIAST BP <80 MM HG: CPT | Mod: CPTII,,, | Performed by: THORACIC SURGERY (CARDIOTHORACIC VASCULAR SURGERY)

## 2024-03-13 PROCEDURE — 1160F RVW MEDS BY RX/DR IN RCRD: CPT | Mod: CPTII,,, | Performed by: THORACIC SURGERY (CARDIOTHORACIC VASCULAR SURGERY)

## 2024-03-13 PROCEDURE — 99204 OFFICE O/P NEW MOD 45 MIN: CPT | Mod: ,,, | Performed by: THORACIC SURGERY (CARDIOTHORACIC VASCULAR SURGERY)

## 2024-03-13 RX ORDER — LISINOPRIL 10 MG/1
10 TABLET ORAL DAILY
COMMUNITY

## 2024-03-13 NOTE — PROGRESS NOTES
History & Physical    SUBJECTIVE:     History of Present Illness:  The patient is presenting for evaluation of severe aortic valve stenosis with a mean gradient of 41.  His ejection fraction is preserved at 60%.  He is here for discussion about aortic valve replacement.  He remains asymptomatic    Chief Complaint   Patient presents with    Pre-op Exam     REFERRAL-DR. ROJAS-MARLENI THOMPSON. DX: MOD TO SEVERE AORTIC STENOSIS, EF 65%. PMH: HTN, PROSTATE CA WITH SX, PHLEBECTOMY, VARICOSE VEIN LT LEG       Review of patient's allergies indicates:  No Known Allergies    Current Outpatient Medications   Medication Sig Dispense Refill    lisinopriL 10 MG tablet Take 10 mg by mouth once daily.      EPINEPHrine (EPIPEN) 0.3 mg/0.3 mL AtIn SMARTSI pre-filled pen syringe IM Daily PRN      lisinopriL-hydrochlorothiazide (PRINZIDE,ZESTORETIC) 20-25 mg Tab Take 1 tablet by mouth once daily. (Patient not taking: Reported on 3/13/2024) 90 tablet 3    UNABLE TO FIND once a week. medication name: Allergy Shot       No current facility-administered medications for this visit.       Past Medical History:   Diagnosis Date    Allergy     Anemia     Hypertension     Mild aortic valve stenosis     Prostate cancer     Systolic murmur      History reviewed. No pertinent surgical history.  History reviewed. No pertinent family history.  Social History     Tobacco Use    Smoking status: Former     Current packs/day: 0.00     Types: Cigarettes     Start date:      Quit date:      Years since quittin.2    Smokeless tobacco: Never   Substance Use Topics    Alcohol use: Yes     Alcohol/week: 15.0 standard drinks of alcohol     Types: 15 Cans of beer per week     Comment: 2-3 beers 4-5 days weekly    Drug use: Not Currently     Types: Marijuana        Review of Systems:  Review of Systems   Constitutional: Negative.    HENT: Negative.     Eyes: Negative.    Respiratory: Negative.     Cardiovascular: Negative.    Gastrointestinal:  "Negative.    Endocrine: Negative.    Genitourinary: Negative.    Musculoskeletal: Negative.         Claudications   Skin: Negative.    Allergic/Immunologic: Negative.    Neurological: Negative.    Hematological: Negative.    Psychiatric/Behavioral: Negative.         OBJECTIVE:     Vital Signs (Most Recent)  Pulse: 71 (03/13/24 1040)  BP: 139/79 (03/13/24 1040)  SpO2: 99 % (03/13/24 1040)  5' 10" (1.778 m)  87.9 kg (193 lb 12.8 oz)     Physical Exam:  Physical Exam  Vitals reviewed.   Constitutional:       Appearance: Normal appearance.   HENT:      Head: Normocephalic and atraumatic.      Nose: Nose normal.      Mouth/Throat:      Mouth: Mucous membranes are dry.      Pharynx: Oropharynx is clear.   Eyes:      Extraocular Movements: Extraocular movements intact.      Conjunctiva/sclera: Conjunctivae normal.      Pupils: Pupils are equal, round, and reactive to light.   Cardiovascular:      Rate and Rhythm: Normal rate and regular rhythm.      Pulses: Normal pulses.   Pulmonary:      Effort: Pulmonary effort is normal.      Breath sounds: Normal breath sounds.   Abdominal:      General: Abdomen is flat.      Palpations: Abdomen is soft.   Musculoskeletal:         General: Normal range of motion.      Cervical back: Neck supple.   Skin:     General: Skin is warm and dry.   Neurological:      General: No focal deficit present.   Psychiatric:         Mood and Affect: Mood normal.         Laboratory:  None      Diagnostic Results:  Echocardiogram reviewed      ASSESSMENT/PLAN:     Severe asymptomatic aortic valve stenosis.  The patient will need a cardiac catheterization prior to intervention.  I have explained for him the risks and benefits of the procedure including risk of bleeding infection myocardial infarction stroke renal failure and death.  We will have further discussion after the cardiac catheterization.                "

## 2024-04-15 RX ORDER — TADALAFIL 10 MG/1
10 TABLET ORAL DAILY PRN
COMMUNITY

## 2024-04-18 ENCOUNTER — HOSPITAL ENCOUNTER (OUTPATIENT)
Facility: HOSPITAL | Age: 64
Discharge: HOME | End: 2024-04-18
Attending: INTERNAL MEDICINE | Admitting: INTERNAL MEDICINE
Payer: MEDICARE

## 2024-04-18 VITALS
DIASTOLIC BLOOD PRESSURE: 60 MMHG | RESPIRATION RATE: 20 BRPM | TEMPERATURE: 99 F | HEART RATE: 60 BPM | WEIGHT: 187.63 LBS | HEIGHT: 70 IN | SYSTOLIC BLOOD PRESSURE: 106 MMHG | OXYGEN SATURATION: 99 % | BODY MASS INDEX: 26.86 KG/M2

## 2024-04-18 DIAGNOSIS — I35.0 MODERATE TO SEVERE AORTIC STENOSIS: ICD-10-CM

## 2024-04-18 DIAGNOSIS — R07.9 CHEST PAIN: ICD-10-CM

## 2024-04-18 DIAGNOSIS — Z01.818 PREOPERATIVE CLEARANCE: ICD-10-CM

## 2024-04-18 PROCEDURE — 93454 CORONARY ARTERY ANGIO S&I: CPT | Performed by: INTERNAL MEDICINE

## 2024-04-18 PROCEDURE — 93010 ELECTROCARDIOGRAM REPORT: CPT | Mod: ,,, | Performed by: INTERNAL MEDICINE

## 2024-04-18 PROCEDURE — C1894 INTRO/SHEATH, NON-LASER: HCPCS | Performed by: INTERNAL MEDICINE

## 2024-04-18 PROCEDURE — 93005 ELECTROCARDIOGRAM TRACING: CPT | Mod: 59

## 2024-04-18 PROCEDURE — 99152 MOD SED SAME PHYS/QHP 5/>YRS: CPT | Performed by: INTERNAL MEDICINE

## 2024-04-18 PROCEDURE — C1887 CATHETER, GUIDING: HCPCS | Performed by: INTERNAL MEDICINE

## 2024-04-18 PROCEDURE — C1769 GUIDE WIRE: HCPCS | Performed by: INTERNAL MEDICINE

## 2024-04-18 PROCEDURE — 63600175 PHARM REV CODE 636 W HCPCS: Performed by: INTERNAL MEDICINE

## 2024-04-18 PROCEDURE — 25000003 PHARM REV CODE 250: Performed by: INTERNAL MEDICINE

## 2024-04-18 PROCEDURE — 25500020 PHARM REV CODE 255: Performed by: INTERNAL MEDICINE

## 2024-04-18 RX ORDER — ACETAMINOPHEN 325 MG/1
650 TABLET ORAL EVERY 4 HOURS PRN
Status: DISCONTINUED | OUTPATIENT
Start: 2024-04-18 | End: 2024-04-18 | Stop reason: HOSPADM

## 2024-04-18 RX ORDER — MIDAZOLAM HYDROCHLORIDE 1 MG/ML
INJECTION, SOLUTION INTRAMUSCULAR; INTRAVENOUS
Status: DISCONTINUED | OUTPATIENT
Start: 2024-04-18 | End: 2024-04-18 | Stop reason: HOSPADM

## 2024-04-18 RX ORDER — MORPHINE SULFATE 4 MG/ML
2 INJECTION, SOLUTION INTRAMUSCULAR; INTRAVENOUS EVERY 4 HOURS PRN
Status: DISCONTINUED | OUTPATIENT
Start: 2024-04-18 | End: 2024-04-18 | Stop reason: HOSPADM

## 2024-04-18 RX ORDER — LIDOCAINE HYDROCHLORIDE 10 MG/ML
INJECTION, SOLUTION EPIDURAL; INFILTRATION; INTRACAUDAL; PERINEURAL
Status: DISCONTINUED | OUTPATIENT
Start: 2024-04-18 | End: 2024-04-18 | Stop reason: HOSPADM

## 2024-04-18 RX ORDER — SODIUM CHLORIDE 9 MG/ML
INJECTION, SOLUTION INTRAVENOUS CONTINUOUS
Status: DISCONTINUED | OUTPATIENT
Start: 2024-04-18 | End: 2024-04-18 | Stop reason: HOSPADM

## 2024-04-18 RX ORDER — NITROGLYCERIN 20 MG/100ML
INJECTION INTRAVENOUS
Status: DISCONTINUED | OUTPATIENT
Start: 2024-04-18 | End: 2024-04-18 | Stop reason: HOSPADM

## 2024-04-18 RX ORDER — HEPARIN SODIUM 1000 [USP'U]/ML
INJECTION, SOLUTION INTRAVENOUS; SUBCUTANEOUS
Status: DISCONTINUED | OUTPATIENT
Start: 2024-04-18 | End: 2024-04-18 | Stop reason: HOSPADM

## 2024-04-18 RX ORDER — DIAZEPAM 5 MG/1
10 TABLET ORAL ONCE
Status: COMPLETED | OUTPATIENT
Start: 2024-04-18 | End: 2024-04-18

## 2024-04-18 RX ORDER — HYDROCODONE BITARTRATE AND ACETAMINOPHEN 5; 325 MG/1; MG/1
1 TABLET ORAL EVERY 4 HOURS PRN
Status: DISCONTINUED | OUTPATIENT
Start: 2024-04-18 | End: 2024-04-18 | Stop reason: HOSPADM

## 2024-04-18 RX ORDER — FENTANYL CITRATE 50 UG/ML
INJECTION, SOLUTION INTRAMUSCULAR; INTRAVENOUS
Status: DISCONTINUED | OUTPATIENT
Start: 2024-04-18 | End: 2024-04-18 | Stop reason: HOSPADM

## 2024-04-18 RX ORDER — ONDANSETRON HYDROCHLORIDE 2 MG/ML
4 INJECTION, SOLUTION INTRAVENOUS EVERY 8 HOURS PRN
Status: DISCONTINUED | OUTPATIENT
Start: 2024-04-18 | End: 2024-04-18 | Stop reason: HOSPADM

## 2024-04-18 RX ORDER — DIPHENHYDRAMINE HCL 50 MG
50 CAPSULE ORAL ONCE
Status: COMPLETED | OUTPATIENT
Start: 2024-04-18 | End: 2024-04-18

## 2024-04-18 RX ORDER — HYDRALAZINE HYDROCHLORIDE 20 MG/ML
10 INJECTION INTRAMUSCULAR; INTRAVENOUS EVERY 4 HOURS PRN
Status: DISCONTINUED | OUTPATIENT
Start: 2024-04-18 | End: 2024-04-18 | Stop reason: HOSPADM

## 2024-04-18 RX ADMIN — DIAZEPAM 10 MG: 5 TABLET ORAL at 07:04

## 2024-04-18 RX ADMIN — DIPHENHYDRAMINE HYDROCHLORIDE 50 MG: 50 CAPSULE ORAL at 07:04

## 2024-04-18 RX ADMIN — SODIUM CHLORIDE: 9 INJECTION, SOLUTION INTRAVENOUS at 06:04

## 2024-04-18 NOTE — Clinical Note
#@1 Unit blood has been started. Pt does express question about if the donor blood person has had the CoVid-19 vaccine, requests to pause and ask blood bank if this information cane be  obtained. This RN has spoken to [employer] who inform this is not a donor screening question and this information is not available. Pt still consents to continue with receiving the blood.     The wire was inserted into the aorta.

## 2024-04-18 NOTE — Clinical Note
The catheter was repositioned into the and was inserted over the wire into the ostium   left main. Hemodynamics were performed.  An angiography was performed of the left coronary arteries. Multiple views were taken. The angiography was performed via power injection.

## 2024-04-18 NOTE — DISCHARGE INSTRUCTIONS
Remove dressing and armboard in 24hrs.  - Can shower in 24hrs, use soap and water only.   -No driving for two Days  -Do not lift anything heavier than a gallon of milk for 5 days.  -Do not submerge site under water for 5 days.   -No lotions, powders, creams around site for 5 days.  - Return to the nearest emergency room if you start running a fever; have any kind of discharge coming from the site, the site looks red or swollen.  - If site starts to bleed, l apply pressure to the site Right Radial  and call 911.

## 2024-04-22 LAB
OHS QRS DURATION: 86 MS
OHS QTC CALCULATION: 410 MS

## 2024-05-01 ENCOUNTER — OFFICE VISIT (OUTPATIENT)
Dept: CARDIAC SURGERY | Facility: CLINIC | Age: 64
End: 2024-05-01
Payer: MEDICARE

## 2024-05-01 VITALS — HEIGHT: 70 IN | BODY MASS INDEX: 27.35 KG/M2 | WEIGHT: 191 LBS

## 2024-05-01 DIAGNOSIS — I35.0 NONRHEUMATIC AORTIC VALVE STENOSIS: Primary | ICD-10-CM

## 2024-05-01 PROCEDURE — 99214 OFFICE O/P EST MOD 30 MIN: CPT | Mod: ,,, | Performed by: THORACIC SURGERY (CARDIOTHORACIC VASCULAR SURGERY)

## 2024-05-01 PROCEDURE — 3008F BODY MASS INDEX DOCD: CPT | Mod: CPTII,,, | Performed by: THORACIC SURGERY (CARDIOTHORACIC VASCULAR SURGERY)

## 2024-05-01 PROCEDURE — 1159F MED LIST DOCD IN RCRD: CPT | Mod: CPTII,,, | Performed by: THORACIC SURGERY (CARDIOTHORACIC VASCULAR SURGERY)

## 2024-05-01 PROCEDURE — 1160F RVW MEDS BY RX/DR IN RCRD: CPT | Mod: CPTII,,, | Performed by: THORACIC SURGERY (CARDIOTHORACIC VASCULAR SURGERY)

## 2024-05-01 PROCEDURE — 4010F ACE/ARB THERAPY RXD/TAKEN: CPT | Mod: CPTII,,, | Performed by: THORACIC SURGERY (CARDIOTHORACIC VASCULAR SURGERY)

## 2024-05-01 NOTE — PROGRESS NOTES
History & Physical    SUBJECTIVE:     History of Present Illness:  The patient is well known to me he is presenting for evaluation of severe aortic valve stenosis.  Recent cardiac catheterization revealed non obstructive coronary artery disease.  He is here for possible scheduling of the procedure.      Chief Complaint   Patient presents with    Pre-op Exam     F/U TO DISCUSS SURGERY-AVR W/ DR. GREGORIO, HEART CATH 24 DR. CARMICHAEL SAID OK TO SCHEDULE BUT PT WANTS TO FURTHER DISCUSS W/ DR. GREGORIO       Review of patient's allergies indicates:  No Known Allergies    Current Outpatient Medications   Medication Sig Dispense Refill    EPINEPHrine (EPIPEN) 0.3 mg/0.3 mL AtIn SMARTSI pre-filled pen syringe IM Daily PRN      lisinopriL 10 MG tablet Take 10 mg by mouth once daily.      tadalafiL (CIALIS) 10 MG tablet Take 10 mg by mouth daily as needed for Erectile Dysfunction.      UNABLE TO FIND once a week. medication name: Allergy Shot       No current facility-administered medications for this visit.       Past Medical History:   Diagnosis Date    Allergy     Anemia     Hypertension     Mild aortic valve stenosis     Prostate cancer     Systolic murmur      Past Surgical History:   Procedure Laterality Date    LEFT HEART CATHETERIZATION N/A 2024    Procedure: Left heart cath;  Surgeon: Janessa Voss MD;  Location: [facility] CATH LAB;  Service: Cardiology;  Laterality: N/A;    PHLEBECTOMY Left     LEG    PROSTATE SURGERY       Family History   Problem Relation Name Age of Onset    Heart attack Mother      Heart attack Father      Cancer Brother       Social History     Tobacco Use    Smoking status: Former     Current packs/day: 0.00     Types: Cigarettes     Start date:      Quit date:      Years since quittin.3     Passive exposure: Past    Smokeless tobacco: Never   Substance Use Topics    Alcohol use: Yes     Alcohol/week: 15.0 standard drinks of alcohol     Types: 15 Cans of beer per  "week     Comment: 2-3 beers 4-5 days weekly    Drug use: Not Currently     Types: Marijuana        Review of Systems:  Review of Systems   Constitutional: Negative.    HENT: Negative.     Eyes: Negative.    Respiratory: Negative.     Cardiovascular: Negative.    Gastrointestinal: Negative.    Endocrine: Negative.    Genitourinary: Negative.    Musculoskeletal: Negative.         Claudications   Skin: Negative.    Allergic/Immunologic: Negative.    Neurological: Negative.    Hematological: Negative.    Psychiatric/Behavioral: Negative.         OBJECTIVE:     Vital Signs (Most Recent)     5' 10" (1.778 m)  86.6 kg (191 lb)     Physical Exam:  Physical Exam  Vitals reviewed.   Constitutional:       Appearance: Normal appearance.   HENT:      Head: Normocephalic and atraumatic.      Nose: Nose normal.      Mouth/Throat:      Mouth: Mucous membranes are dry.      Pharynx: Oropharynx is clear.   Eyes:      Extraocular Movements: Extraocular movements intact.      Conjunctiva/sclera: Conjunctivae normal.      Pupils: Pupils are equal, round, and reactive to light.   Cardiovascular:      Rate and Rhythm: Normal rate and regular rhythm.      Pulses: Normal pulses.   Pulmonary:      Effort: Pulmonary effort is normal.      Breath sounds: Normal breath sounds.   Abdominal:      General: Abdomen is flat.      Palpations: Abdomen is soft.   Musculoskeletal:         General: Normal range of motion.      Cervical back: Neck supple.   Skin:     General: Skin is warm and dry.   Neurological:      General: No focal deficit present.   Psychiatric:         Mood and Affect: Mood normal.         Laboratory:  None      Diagnostic Results:  Cardiac catheterization reviewed      ASSESSMENT/PLAN:     Severe aortic valve stenosis.  Again the patient remains asymptomatic with a no change in his echocardiogram.  At this time he would like to wait and I believe this is acceptable.  He will follow up with Dr. Voss.  RTC when " needed

## 2024-07-05 ENCOUNTER — TELEPHONE (OUTPATIENT)
Dept: FAMILY MEDICINE | Facility: CLINIC | Age: 64
End: 2024-07-05
Payer: MEDICARE

## 2024-07-05 DIAGNOSIS — I10 PRIMARY HYPERTENSION: ICD-10-CM

## 2024-07-05 DIAGNOSIS — Z12.5 PROSTATE CANCER SCREENING: ICD-10-CM

## 2024-07-05 DIAGNOSIS — D64.9 ANEMIA, UNSPECIFIED TYPE: ICD-10-CM

## 2024-07-05 DIAGNOSIS — Z00.00 ANNUAL PHYSICAL EXAM: Primary | ICD-10-CM

## 2024-07-05 DIAGNOSIS — R73.01 IMPAIRED FASTING GLUCOSE: ICD-10-CM

## 2024-07-05 DIAGNOSIS — R79.89 OTHER SPECIFIED ABNORMAL FINDINGS OF BLOOD CHEMISTRY: ICD-10-CM

## 2024-07-05 DIAGNOSIS — C61 PROSTATE CANCER: ICD-10-CM

## 2024-07-10 ENCOUNTER — LAB VISIT (OUTPATIENT)
Dept: LAB | Facility: HOSPITAL | Age: 64
End: 2024-07-10
Attending: NURSE PRACTITIONER
Payer: MEDICARE

## 2024-07-10 DIAGNOSIS — Z00.00 ANNUAL PHYSICAL EXAM: ICD-10-CM

## 2024-07-10 DIAGNOSIS — I10 PRIMARY HYPERTENSION: ICD-10-CM

## 2024-07-10 DIAGNOSIS — R79.89 OTHER SPECIFIED ABNORMAL FINDINGS OF BLOOD CHEMISTRY: ICD-10-CM

## 2024-07-10 DIAGNOSIS — R73.01 IMPAIRED FASTING GLUCOSE: ICD-10-CM

## 2024-07-10 DIAGNOSIS — C61 PROSTATE CANCER: ICD-10-CM

## 2024-07-10 DIAGNOSIS — Z12.5 PROSTATE CANCER SCREENING: ICD-10-CM

## 2024-07-10 DIAGNOSIS — D64.9 ANEMIA, UNSPECIFIED TYPE: ICD-10-CM

## 2024-07-10 LAB
ALBUMIN SERPL-MCNC: 3.5 G/DL (ref 3.4–4.8)
ALBUMIN/GLOB SERPL: 1.1 RATIO (ref 1.1–2)
ALP SERPL-CCNC: 53 UNIT/L (ref 40–150)
ALT SERPL-CCNC: 18 UNIT/L (ref 0–55)
ANION GAP SERPL CALC-SCNC: 3 MEQ/L
AST SERPL-CCNC: 17 UNIT/L (ref 5–34)
BASOPHILS # BLD AUTO: 0.03 X10(3)/MCL
BASOPHILS NFR BLD AUTO: 0.7 %
BILIRUB SERPL-MCNC: 0.5 MG/DL
BUN SERPL-MCNC: 12.3 MG/DL (ref 8.4–25.7)
CALCIUM SERPL-MCNC: 9.4 MG/DL (ref 8.8–10)
CHLORIDE SERPL-SCNC: 111 MMOL/L (ref 98–107)
CHOLEST SERPL-MCNC: 169 MG/DL
CHOLEST/HDLC SERPL: 3 {RATIO} (ref 0–5)
CO2 SERPL-SCNC: 28 MMOL/L (ref 23–31)
CREAT SERPL-MCNC: 1 MG/DL (ref 0.73–1.18)
CREAT/UREA NIT SERPL: 12
EOSINOPHIL # BLD AUTO: 0.26 X10(3)/MCL (ref 0–0.9)
EOSINOPHIL NFR BLD AUTO: 5.8 %
ERYTHROCYTE [DISTWIDTH] IN BLOOD BY AUTOMATED COUNT: 12.8 % (ref 11.5–17)
EST. AVERAGE GLUCOSE BLD GHB EST-MCNC: 102.5 MG/DL
GFR SERPLBLD CREATININE-BSD FMLA CKD-EPI: >60 ML/MIN/1.73/M2
GLOBULIN SER-MCNC: 3.1 GM/DL (ref 2.4–3.5)
GLUCOSE SERPL-MCNC: 111 MG/DL (ref 82–115)
HBA1C MFR BLD: 5.2 %
HCT VFR BLD AUTO: 41.1 % (ref 42–52)
HDLC SERPL-MCNC: 53 MG/DL (ref 35–60)
HGB BLD-MCNC: 13.7 G/DL (ref 14–18)
IMM GRANULOCYTES # BLD AUTO: 0.01 X10(3)/MCL (ref 0–0.04)
IMM GRANULOCYTES NFR BLD AUTO: 0.2 %
LDLC SERPL CALC-MCNC: 104 MG/DL (ref 50–140)
LYMPHOCYTES # BLD AUTO: 1.24 X10(3)/MCL (ref 0.6–4.6)
LYMPHOCYTES NFR BLD AUTO: 27.7 %
MCH RBC QN AUTO: 31.8 PG (ref 27–31)
MCHC RBC AUTO-ENTMCNC: 33.3 G/DL (ref 33–36)
MCV RBC AUTO: 95.4 FL (ref 80–94)
MONOCYTES # BLD AUTO: 0.52 X10(3)/MCL (ref 0.1–1.3)
MONOCYTES NFR BLD AUTO: 11.6 %
NEUTROPHILS # BLD AUTO: 2.41 X10(3)/MCL (ref 2.1–9.2)
NEUTROPHILS NFR BLD AUTO: 54 %
PLATELET # BLD AUTO: 155 X10(3)/MCL (ref 130–400)
PMV BLD AUTO: 10 FL (ref 7.4–10.4)
POTASSIUM SERPL-SCNC: 5.1 MMOL/L (ref 3.5–5.1)
PROT SERPL-MCNC: 6.6 GM/DL (ref 5.8–7.6)
PSA SERPL-MCNC: <0.1 NG/ML
RBC # BLD AUTO: 4.31 X10(6)/MCL (ref 4.7–6.1)
SODIUM SERPL-SCNC: 142 MMOL/L (ref 136–145)
TRIGL SERPL-MCNC: 59 MG/DL (ref 34–140)
VLDLC SERPL CALC-MCNC: 12 MG/DL
WBC # BLD AUTO: 4.47 X10(3)/MCL (ref 4.5–11.5)

## 2024-07-10 PROCEDURE — 84153 ASSAY OF PSA TOTAL: CPT

## 2024-07-10 PROCEDURE — 36415 COLL VENOUS BLD VENIPUNCTURE: CPT

## 2024-07-10 PROCEDURE — 80061 LIPID PANEL: CPT

## 2024-07-10 PROCEDURE — 83036 HEMOGLOBIN GLYCOSYLATED A1C: CPT

## 2024-07-10 PROCEDURE — 85025 COMPLETE CBC W/AUTO DIFF WBC: CPT

## 2024-07-10 PROCEDURE — 80053 COMPREHEN METABOLIC PANEL: CPT

## 2024-07-11 ENCOUNTER — OFFICE VISIT (OUTPATIENT)
Dept: FAMILY MEDICINE | Facility: CLINIC | Age: 64
End: 2024-07-11
Payer: MEDICARE

## 2024-07-11 VITALS
RESPIRATION RATE: 16 BRPM | BODY MASS INDEX: 27.77 KG/M2 | HEART RATE: 60 BPM | TEMPERATURE: 98 F | SYSTOLIC BLOOD PRESSURE: 130 MMHG | WEIGHT: 194 LBS | DIASTOLIC BLOOD PRESSURE: 76 MMHG | OXYGEN SATURATION: 98 % | HEIGHT: 70 IN

## 2024-07-11 DIAGNOSIS — D64.9 ANEMIA, UNSPECIFIED TYPE: ICD-10-CM

## 2024-07-11 DIAGNOSIS — Z12.5 PROSTATE CANCER SCREENING: ICD-10-CM

## 2024-07-11 DIAGNOSIS — I10 PRIMARY HYPERTENSION: ICD-10-CM

## 2024-07-11 DIAGNOSIS — Z00.00 WELCOME TO MEDICARE PREVENTIVE VISIT: Primary | ICD-10-CM

## 2024-07-11 DIAGNOSIS — C61 PROSTATE CANCER: ICD-10-CM

## 2024-07-11 DIAGNOSIS — Z71.89 ADVANCED CARE PLANNING/COUNSELING DISCUSSION: ICD-10-CM

## 2024-07-11 DIAGNOSIS — I35.0 NONRHEUMATIC AORTIC VALVE STENOSIS: ICD-10-CM

## 2024-07-11 NOTE — PROGRESS NOTES
Patient ID: [id number]     Chief Complaint: No chief complaint on file.      HPI:     Amadeo Hutchins is a 64 y.o. male here today for a Medicare Wellness. No other complaints today.       -------------------------------------    Allergy    Anemia    Hypertension    Mild aortic valve stenosis    Prostate cancer    Systolic murmur        Past Surgical History:   Procedure Laterality Date    LEFT HEART CATHETERIZATION N/A 2024    Procedure: Left heart cath;  Surgeon: Janessa Voss MD;  Location: [facility] CATH LAB;  Service: Cardiology;  Laterality: N/A;    PHLEBECTOMY Left     LEG    PROSTATE SURGERY         Review of patient's allergies indicates:  No Known Allergies    Outpatient Medications Marked as Taking for the 24 encounter (Office Visit) with Christianne Michael FNP   Medication Sig Dispense Refill    EPINEPHrine (EPIPEN) 0.3 mg/0.3 mL AtIn SMARTSI pre-filled pen syringe IM Daily PRN      lisinopriL 10 MG tablet Take 10 mg by mouth once daily.      tadalafiL (CIALIS) 10 MG tablet Take 10 mg by mouth daily as needed for Erectile Dysfunction.      UNABLE TO FIND once a week. medication name: Allergy Shot         Social History     Socioeconomic History    Marital status:    Tobacco Use    Smoking status: Former     Current packs/day: 0.00     Types: Cigarettes     Start date:      Quit date:      Years since quittin.     Passive exposure: Past    Smokeless tobacco: Never   Substance and Sexual Activity    Alcohol use: Yes     Alcohol/week: 15.0 standard drinks of alcohol     Types: 15 Cans of beer per week     Comment: 2-3 beers 4-5 days weekly    Drug use: Not Currently     Types: Marijuana    Sexual activity: Yes     Partners: Female        Family History   Problem Relation Name Age of Onset    Heart attack Mother      Heart attack Father      Cancer Brother          Patient Care Team:  Christianne Michael FNP as PCP - General (Family Medicine)  Ambrose Rome MD as  Consulting Physician (Gastroenterology)  Napoleon Tobar MD as Consulting Physician (Urology)  [facility] (Cardiovascular Disease)       Subjective:     Review of Systems   Constitutional: Negative.    HENT: Negative.     Eyes: Negative.    Respiratory: Negative.     Cardiovascular: Negative.    Gastrointestinal: Negative.    Genitourinary: Negative.    Musculoskeletal: Negative.    Skin: Negative.    Neurological: Negative.    Endo/Heme/Allergies: Negative.    Psychiatric/Behavioral: Negative.     All other systems reviewed and are negative.        Patient Reported Health Risk Assessment  What [age] 65-69  Are you male or female?: Male  During the past four weeks, how much have you been bothered by emotional problems such as feeling anxious, depressed, irritable, sad, or downhearted and blue?: Not at all  During the past five weeks, has your physical and/or emotional health limited your social activities with family, friends, neighbors, or groups?: Not at all  During the past four weeks, how much bodily pain have you generally had?: Mild pain  During the past four weeks, was someone available to help if you needed and wanted help?: Yes, as much as I wanted  During the past four weeks, what was the hardest physical activity you could do for at least two minutes?: Heavy  Can you get to places out of walking distance without help?  (For example, can you travel alone on buses or taxis, or drive your own car?): Yes  Can you go shopping for groceries or clothes without someone's help?: Yes  Can you prepare your own meals?: Yes  Can you do your own housework without help?: Yes  Because of any health problems, do you need the help of another person with your personal care needs such as eating, bathing, dressing, or getting around the house?: No  Can you handle your own money without help?: Yes  During the past four weeks, how would you rate your health in general?:  Good  How have things been going for you during the past four weeks?: Pretty well  Are you having difficulties driving your car?: No  Do you always fasten your seat belt when you are in a car?: Yes, usually  How often in the past four weeks have you been bothered by falling or dizzy when standing up?: Never  How often in the past four weeks have you been bothered by sexual problems?: Never  How often in the past four weeks have you been bothered by trouble eating well?: Never  How often in the past four weeks have you been bothered by teeth or denture problems?: Never  How often in the past four weeks have you been bothered with problems using the telephone?: Never  How often in the past four weeks have you been bothered by tiredness or fatigue?: Never  Have you fallen two or more times in the past year?: No  Are you afraid of falling?: No  Are you a smoker?: No  During the past four weeks, how many drinks of wine, beer, or other alcoholic beverages did you have?: One drink or less per week  Do you exercise for about 20 minutes three or more days a week?: Yes, most of the time  Have you been given any information to help you with hazards in your house that might hurt you?: Yes  Have you been given any information to help you with keeping track of your medications?: Yes  How often do you have trouble taking medicines the way you've been told to take them?: I always take them as prescribed  How confident are you that you can control and manage most of your health problems?: Very confident  What is your race? (Check all that apply.):     Opioid Screening: Patient medication list reviewed, patient is not taking prescription opioids. Patient is not using additional opioids than prescribed. Patient is at low risk of substance abuse based on this opioid use history.      A personalized 5-10 year written screening schedule and personal prevention plan has been developed using the USPSTF age appropriate  "recommendations and this was provided to the patient at the end of todays visit. Please see the AVS for those details     Objective:     /76 (BP Location: Left arm)   Pulse 60   Temp 97.8 °F (36.6 °C) (Oral)   Resp 16   Ht 5' 10" (1.778 m)   Wt 88 kg (194 lb)   SpO2 98%   BMI 27.84 kg/m²     Physical Exam  Vitals and nursing note reviewed.   Constitutional:       Appearance: Normal appearance. He is normal weight.   HENT:      Head: Normocephalic and atraumatic.      Right Ear: Tympanic membrane, ear canal and external ear normal.      Left Ear: Tympanic membrane, ear canal and external ear normal.      Nose: Nose normal.      Mouth/Throat:      Mouth: Mucous membranes are moist.      Pharynx: Oropharynx is clear.   Eyes:      Extraocular Movements: Extraocular movements intact.      Conjunctiva/sclera: Conjunctivae normal.      Pupils: Pupils are equal, round, and reactive to light.   Cardiovascular:      Rate and Rhythm: Normal rate and regular rhythm.      Pulses: Normal pulses.      Heart sounds: Murmur heard.   Pulmonary:      Effort: Pulmonary effort is normal.      Breath sounds: Normal breath sounds.   Abdominal:      General: Abdomen is flat. Bowel sounds are normal.      Palpations: Abdomen is soft.   Musculoskeletal:         General: Normal range of motion.      Cervical back: Normal range of motion and neck supple.   Skin:     General: Skin is warm and dry.   Neurological:      General: No focal deficit present.      Mental Status: He is alert and oriented to person, place, and time.   Psychiatric:         Mood and Affect: Mood normal.         Behavior: Behavior normal.         Thought Content: Thought content normal.         Judgment: Judgment normal.                No data to display                  7/11/2024    10:20 AM 5/1/2024     1:20 PM 3/13/2024    10:30 AM   Fall Risk Assessment - Outpatient   Mobility Status Ambulatory Ambulatory Ambulatory   Number of falls 0  0   Identified as " fall risk False  False           Depression Screening  Over the past two weeks, has the patient felt down, depressed, or hopeless?: No  Over the past two weeks, has the patient felt little interest or pleasure in doing things?: No  Functional Ability/Safety Screening  Was the patient's timed Up & Go test unsteady or longer than 30 seconds?: No  Does the patient need help with phone, transportation, shopping, preparing meals, housework, laundry, meds, or managing money?: No  Does the patient's home have rugs in the hallway, lack grab bars in the bathroom, lack handrails on the stairs or have poor lighting?: No  Have you noticed any hearing difficulties?: No  Cognitive Function (Assessed through direct observation with due consideration of information obtained by way of patient reports and/or concerns raised by family, friends, caretakers, or others)    Does the patient repeat questions/statements in the same day?: No  Does the patient have trouble remembering the date, year, and time?: No  Does the patient have difficulty managing finances?: No  Does the patient have a decreased sense of direction?: No  Assessment and Plan       ICD-10-CM ICD-9-CM   1. Welcome to Medicare preventive visit  Z00.00 V70.0   2. Advanced care planning/counseling discussion  Z71.89 V65.49   3. Prostate cancer  C61 185   4. Nonrheumatic aortic valve stenosis  I35.0 424.1   5. Primary hypertension  I10 401.9   6. Anemia, unspecified type  D64.9 285.9   7. Prostate cancer screening  Z12.5 V76.44     1. Welcome to Medicare preventive visit  Overview:  Medicare annual wellness visit yearly in July    Assessment & Plan:  Welcome to Medicare visit today, will follow-up in 1 year.      2. Advanced care planning/counseling discussion  Assessment & Plan:  Discussed advanced care planning, patient reports he does have healthcare power-of- and living will at home.  Encouraged to bring copies into the office to scan into chart.      3. Prostate  cancer  Overview:  Dr. Tobar  Diagnosed 12/11/2014, PSA at time of diagnosis 9.79  02/18/2015 - radical prostatectomy    Assessment & Plan:  PSA less than 0.10, repeat 1 year.      4. Nonrheumatic aortic valve stenosis  Overview:  Previously followed by Dr. Cortez  01/09/2018 - TTE - calcification of the right non coronary aortic valve leaflets without aortic stenosis, aortic valve appears to be tricuspid  07/14/2023 - TTE at [facility] showing moderate to severe aortic stenosis  07/17/2023 - refer to [facility]  02/05/2024 - TTE per [facility], LVEF 65%, severe aortic stenosis   04/18/2024 - [facility] per Dr Voss - mild coronary artery disease with severe aortic stenosis with recommended AVR   05/01/2024 - visit with Dr Bell to discuss AVR, plan to monitor per patient request     Assessment & Plan:  Recent cardiology and CV surgery records reviewed, continue surveillance every 6 months.    Orders:  -     Comprehensive Metabolic Panel; Future; Expected date: 07/11/2025  -     Lipid Panel; Future; Expected date: 07/11/2025    5. Primary hypertension  Overview:  Lisinopril 10 mg daily    Assessment & Plan:  Stable, continue lisinopril 10 mg daily, follow-up 1 year.    Orders:  -     Comprehensive Metabolic Panel; Future; Expected date: 07/11/2025  -     Lipid Panel; Future; Expected date: 07/11/2025    6. Anemia, unspecified type  Assessment & Plan:  Stable, mild anemia, H&H 13.7 and 41.1.  Repeat 1 year.    Orders:  -     CBC Auto Differential; Future; Expected date: 07/11/2025    7. Prostate cancer screening  Overview:  Followed by Dr. Tobar  PSA yearly    Orders:  -     PSA, Screening; Future; Expected date: 07/11/2025              Medicare Annual Wellness and Personalized Prevention Plan:   Fall Risk + Home Safety + Hearing Impairment + Depression Screen + Cognitive Impairment Screen + Health Risk Assessment all reviewed.       Health Maintenance Topics with due status: Not Due       Topic Last  Completion Date    TETANUS VACCINE 12/17/2019    Influenza Vaccine 12/17/2019    Colorectal Cancer Screening 01/21/2022    Hemoglobin A1c (Prediabetes) 07/10/2024    Lipid Panel 07/10/2024      The patient's Health Maintenance was reviewed and the following appears to be due at this time:   There are no preventive care reminders to display for this patient.      Advance Care Planning     Date: 07/11/2024    Power of   I initiated the process of voluntary advance care planning today and explained the importance of this process to the patient.  I introduced the concept of advance directives to the patient, as well. Then the patient received detailed information about the importance of designating a Health Care Power of  (HCPOA). He was also instructed to communicate with this person about their wishes for future healthcare, should he become sick and lose decision-making capacity. The patient has previously appointed a HCPOA. After our discussion, the patient has not decided to complete a HCPOA and has appointed his significant other, health care agent:  Mariah Hutchins  & health care agent number:  [phone] . I encouraged him to communicate with this person about their wishes for future healthcare, should he become sick and lose decision-making capacity.      A total of 20 min was spent on advance care planning, goals of care discussion, emotional support, formulating and communicating prognosis and exploring burden/benefit of various approaches of treatment. This discussion occurred on a fully voluntary basis with the verbal consent of the patient and/or family.        Opioid Screening: Patient medication list reviewed, patient is not taking prescription opioids. Patient is not using additional opioids than prescribed. Patient is at low risk of substance abuse based on this opioid use history.     Medication List with Changes/Refills   Current Medications    EPINEPHRINE (EPIPEN) 0.3 MG/0.3 ML ATIN     SMARTSI pre-filled pen syringe IM Daily PRN       Start Date: 3/30/2023 End Date: --    LISINOPRIL 10 MG TABLET    Take 10 mg by mouth once daily.       Start Date: --        End Date: --    TADALAFIL (CIALIS) 10 MG TABLET    Take 10 mg by mouth daily as needed for Erectile Dysfunction.       Start Date: --        End Date: --    UNABLE TO FIND    once a week. medication name: Allergy Shot       Start Date: --        End Date: --        Follow up in about 1 year (around 2025) for Annual. In addition to their scheduled follow up, the patient has also been instructed to follow up on as needed basis.

## 2024-07-11 NOTE — ASSESSMENT & PLAN NOTE
Discussed advanced care planning, patient reports he does have healthcare power-of- and living will at home.  Encouraged to bring copies into the office to scan into chart.

## 2024-10-14 PROBLEM — Z00.00 WELCOME TO MEDICARE PREVENTIVE VISIT: Status: RESOLVED | Noted: 2024-07-11 | Resolved: 2024-10-14

## 2024-10-24 ENCOUNTER — LAB VISIT (OUTPATIENT)
Dept: LAB | Facility: HOSPITAL | Age: 64
End: 2024-10-24
Attending: NURSE PRACTITIONER
Payer: MEDICARE

## 2024-10-24 DIAGNOSIS — I10 ESSENTIAL HYPERTENSION, MALIGNANT: Primary | ICD-10-CM

## 2024-10-24 LAB
ALBUMIN SERPL-MCNC: 3.6 G/DL (ref 3.4–4.8)
ALBUMIN/GLOB SERPL: 1.2 RATIO (ref 1.1–2)
ALP SERPL-CCNC: 53 UNIT/L (ref 40–150)
ALT SERPL-CCNC: 18 UNIT/L (ref 0–55)
ANION GAP SERPL CALC-SCNC: 4 MEQ/L
AST SERPL-CCNC: 19 UNIT/L (ref 5–34)
BILIRUB SERPL-MCNC: 0.6 MG/DL
BUN SERPL-MCNC: 13.9 MG/DL (ref 8.4–25.7)
CALCIUM SERPL-MCNC: 9 MG/DL (ref 8.8–10)
CHLORIDE SERPL-SCNC: 106 MMOL/L (ref 98–107)
CHOLEST SERPL-MCNC: 182 MG/DL
CHOLEST/HDLC SERPL: 3 {RATIO} (ref 0–5)
CO2 SERPL-SCNC: 29 MMOL/L (ref 23–31)
CREAT SERPL-MCNC: 1 MG/DL (ref 0.72–1.25)
CREAT/UREA NIT SERPL: 14
ERYTHROCYTE [DISTWIDTH] IN BLOOD BY AUTOMATED COUNT: 13 % (ref 11.5–17)
GFR SERPLBLD CREATININE-BSD FMLA CKD-EPI: >60 ML/MIN/1.73/M2
GLOBULIN SER-MCNC: 3.1 GM/DL (ref 2.4–3.5)
GLUCOSE SERPL-MCNC: 99 MG/DL (ref 82–115)
HCT VFR BLD AUTO: 40.8 % (ref 42–52)
HDLC SERPL-MCNC: 58 MG/DL (ref 35–60)
HGB BLD-MCNC: 13.7 G/DL (ref 14–18)
LDLC SERPL CALC-MCNC: 112 MG/DL (ref 50–140)
MCH RBC QN AUTO: 32.1 PG (ref 27–31)
MCHC RBC AUTO-ENTMCNC: 33.6 G/DL (ref 33–36)
MCV RBC AUTO: 95.6 FL (ref 80–94)
PLATELET # BLD AUTO: 150 X10(3)/MCL (ref 130–400)
PMV BLD AUTO: 9.9 FL (ref 7.4–10.4)
POTASSIUM SERPL-SCNC: 4 MMOL/L (ref 3.5–5.1)
PROT SERPL-MCNC: 6.7 GM/DL (ref 5.8–7.6)
RBC # BLD AUTO: 4.27 X10(6)/MCL (ref 4.7–6.1)
SODIUM SERPL-SCNC: 139 MMOL/L (ref 136–145)
TRIGL SERPL-MCNC: 58 MG/DL (ref 34–140)
VLDLC SERPL CALC-MCNC: 12 MG/DL
WBC # BLD AUTO: 4.01 X10(3)/MCL (ref 4.5–11.5)

## 2024-10-24 PROCEDURE — 85027 COMPLETE CBC AUTOMATED: CPT

## 2024-10-24 PROCEDURE — 80061 LIPID PANEL: CPT

## 2024-10-24 PROCEDURE — 80053 COMPREHEN METABOLIC PANEL: CPT

## 2024-10-24 PROCEDURE — 36415 COLL VENOUS BLD VENIPUNCTURE: CPT

## 2024-12-18 LAB
ALBUMIN SERPL BCP-MCNC: 4.1 G/DL (ref 3.5–5.7)
ALBUMIN/GLOB SERPL ELPH: 1.3 {RATIO} (ref 0.7–2)
ALP SERPL-CCNC: 50 U/L (ref 34–104)
ALT SERPL-CCNC: 29 U/L (ref 7–52)
AST SERPL-CCNC: 22 U/L (ref 13–39)
BILIRUB SERPL-MCNC: 0.7 MG/DL (ref 0.3–1.9)
CALCIUM SERPL-MCNC: 9.3 MG/DL (ref 8.6–10.5)
CHLORIDE: 101 MMOL/L (ref 98–109)
CHOLEST/HDLC SERPL: 2.4 {RATIO} (ref 0–5)
CHOLESTEROL, TOTAL: 154 (ref 0–199)
CO2 SERPL-SCNC: 28 MMOL/L (ref 22–34)
CREATINE, SERUM: 1 (ref 0.6–1.3)
EGFR IF AFRICAN AMERICAN: 90.5
EGFR: 74.8
GLOBULIN SER-MCNC: 3.2 GM/DL (ref 2–4.5)
GLUCOSE: 87 MG/DL (ref 70–106)
HDLC SERPL-MCNC: 65 MG/DL (ref 30–85)
LDLC SERPL CALC-MCNC: 84 MG/DL (ref 0–130)
NONHDLC SERPL-MCNC: 80 MG/DL (ref 0–129)
POTASSIUM: 4.7 MMOL/L (ref 3.4–5)
PROT SERPL-MCNC: 7.3 G/DL (ref 6–8.5)
SODIUM: 140 MMOL/L (ref 135–145)
TRIGL SERPL-MCNC: 77 MG/DL (ref 10–250)
UREA NITROGEN (BUN): 19 MG/DL (ref 7–25)
VLDLC SERPL-MCNC: 15 MG/DL (ref 5–40)

## 2025-01-02 ENCOUNTER — DOCUMENTATION ONLY (OUTPATIENT)
Dept: FAMILY MEDICINE | Facility: CLINIC | Age: 65
End: 2025-01-02
Payer: MEDICARE

## 2025-06-16 LAB — PSA FREE SERPL-MCNC: 0.04 NG/ML (ref 0–4)

## 2025-07-02 LAB
ALBUMIN/GLOB SERPL ELPH: 1.3 {RATIO}
ALBUMIN: 3.9
ALKALINE PHOS(POC): 50
ALT: 22
AST: 22
CALCIUM SERPL-MCNC: 8.9 MG/DL
CHLORIDE: 104 MMOL/L
CHOLEST SERPL-MCNC: 61 MG/DL
CHOLEST SERPL-MSCNC: 142 MG/DL
CHOLEST/HDLC SERPL: 2.5 {RATIO}
CO2 SERPL-SCNC: 27 MMOL/L
CREAT SERPL-MCNC: 0.9 MG/DL
EGFR: 84.2
GLOBULIN SER-MCNC: 3 G/DL
GLUCOSE: 94
HDLC SERPL-MCNC: 57 MG/DL
LDLC SERPL CALC-MCNC: 84 MG/DL
NONHDLC SERPL-MCNC: 85 MG/DL
POTASSIUM: 4.4 MMOL/L
SODIUM BLD-SCNC: 140 MMOL/L
TOTAL BILIRUBIN POC: 0.5
TOTAL PROTEIN: 6.9 G/DL
UREA NITROGEN (BUN): 14 MG/DL
VLDLC SERPL-MCNC: 12 MG/DL

## 2025-07-08 ENCOUNTER — TELEPHONE (OUTPATIENT)
Dept: FAMILY MEDICINE | Facility: CLINIC | Age: 65
End: 2025-07-08
Payer: MEDICARE

## 2025-07-08 NOTE — TELEPHONE ENCOUNTER
Attempted to contact patient on 7/8/25 at 8:49 for previsit. LV reminding patient to complete labs before scheduled appointment.

## 2025-07-21 ENCOUNTER — PATIENT MESSAGE (OUTPATIENT)
Dept: FAMILY MEDICINE | Facility: CLINIC | Age: 65
End: 2025-07-21
Payer: MEDICARE

## 2025-07-28 ENCOUNTER — DOCUMENTATION ONLY (OUTPATIENT)
Dept: FAMILY MEDICINE | Facility: CLINIC | Age: 65
End: 2025-07-28

## 2025-07-28 ENCOUNTER — OFFICE VISIT (OUTPATIENT)
Dept: FAMILY MEDICINE | Facility: CLINIC | Age: 65
End: 2025-07-28
Payer: MEDICARE

## 2025-07-28 VITALS
WEIGHT: 195.81 LBS | HEIGHT: 70 IN | BODY MASS INDEX: 28.03 KG/M2 | RESPIRATION RATE: 16 BRPM | HEART RATE: 74 BPM | DIASTOLIC BLOOD PRESSURE: 70 MMHG | OXYGEN SATURATION: 96 % | SYSTOLIC BLOOD PRESSURE: 117 MMHG

## 2025-07-28 DIAGNOSIS — E78.2 MIXED HYPERLIPIDEMIA: ICD-10-CM

## 2025-07-28 DIAGNOSIS — D64.9 ANEMIA, UNSPECIFIED TYPE: ICD-10-CM

## 2025-07-28 DIAGNOSIS — I10 PRIMARY HYPERTENSION: ICD-10-CM

## 2025-07-28 DIAGNOSIS — Z23 NEED FOR SHINGLES VACCINE: ICD-10-CM

## 2025-07-28 DIAGNOSIS — I35.0 NONRHEUMATIC AORTIC VALVE STENOSIS: ICD-10-CM

## 2025-07-28 DIAGNOSIS — Z71.89 ADVANCED CARE PLANNING/COUNSELING DISCUSSION: ICD-10-CM

## 2025-07-28 DIAGNOSIS — Z85.46 HISTORY OF PROSTATE CANCER: ICD-10-CM

## 2025-07-28 DIAGNOSIS — Z00.00 MEDICARE ANNUAL WELLNESS VISIT, SUBSEQUENT: Primary | ICD-10-CM

## 2025-07-28 PROBLEM — Z12.5 PROSTATE CANCER SCREENING: Status: RESOLVED | Noted: 2023-07-11 | Resolved: 2025-07-28

## 2025-07-28 PROCEDURE — 4010F ACE/ARB THERAPY RXD/TAKEN: CPT | Mod: CPTII,,, | Performed by: NURSE PRACTITIONER

## 2025-07-28 PROCEDURE — 1160F RVW MEDS BY RX/DR IN RCRD: CPT | Mod: CPTII,,, | Performed by: NURSE PRACTITIONER

## 2025-07-28 PROCEDURE — 1158F ADVNC CARE PLAN TLK DOCD: CPT | Mod: CPTII,,, | Performed by: NURSE PRACTITIONER

## 2025-07-28 PROCEDURE — 3288F FALL RISK ASSESSMENT DOCD: CPT | Mod: CPTII,,, | Performed by: NURSE PRACTITIONER

## 2025-07-28 PROCEDURE — 99497 ADVNCD CARE PLAN 30 MIN: CPT | Mod: 33,,, | Performed by: NURSE PRACTITIONER

## 2025-07-28 PROCEDURE — 1159F MED LIST DOCD IN RCRD: CPT | Mod: CPTII,,, | Performed by: NURSE PRACTITIONER

## 2025-07-28 PROCEDURE — 3074F SYST BP LT 130 MM HG: CPT | Mod: CPTII,,, | Performed by: NURSE PRACTITIONER

## 2025-07-28 PROCEDURE — 3078F DIAST BP <80 MM HG: CPT | Mod: CPTII,,, | Performed by: NURSE PRACTITIONER

## 2025-07-28 PROCEDURE — 1126F AMNT PAIN NOTED NONE PRSNT: CPT | Mod: CPTII,,, | Performed by: NURSE PRACTITIONER

## 2025-07-28 PROCEDURE — G0439 PPPS, SUBSEQ VISIT: HCPCS | Mod: ,,, | Performed by: NURSE PRACTITIONER

## 2025-07-28 PROCEDURE — 1101F PT FALLS ASSESS-DOCD LE1/YR: CPT | Mod: CPTII,,, | Performed by: NURSE PRACTITIONER

## 2025-07-28 RX ORDER — ROSUVASTATIN CALCIUM 10 MG/1
10 TABLET, COATED ORAL NIGHTLY
COMMUNITY
Start: 2025-01-02

## 2025-07-28 RX ORDER — ZOSTER VACCINE RECOMBINANT, ADJUVANTED 50 MCG/0.5
0.5 KIT INTRAMUSCULAR ONCE
Qty: 1 EACH | Refills: 1 | Status: SHIPPED | OUTPATIENT
Start: 2025-07-28 | End: 2025-07-28

## 2025-07-28 NOTE — PROGRESS NOTES
Family Medicine      Patient ID: [id number]     Chief Complaint: Medicare Annual Wellness       HPI:     Amadeo Hutchins is a 65 y.o. male here today for a Medicare Annual Wellness visit and comprehensive Health Risk Assessment.     History of Present Illness    CHIEF COMPLAINT:  Patient presents today for Medicare wellness visit.    CARDIOVASCULAR:  His last cardiac ultrasound was performed in December. Cardiac follow-up has been transitioned to annual screening. He denies any changes in cardiac symptoms, including no shortness of breath and no exercise intolerance.    ALLERGIES:  He has a long-standing history of allergies spanning 40 years. He currently receives weekly allergy shots for management.    ANEMIA:  He reports a history of mild anemia present for several years. He is currently asymptomatic with no associated complaints or functional limitations related to the anemia.    MEDICAL HISTORY:  He has a history of chickenpox and one episode of shingles. He describes the shingles episode as painful.          Health Maintenance         Date Due Completion Date    PROSTATE-SPECIFIC ANTIGEN 07/10/2025 7/10/2024    Shingles Vaccine (1 of 2) 07/28/2026 (Originally 1/24/2008) ---    Influenza Vaccine (1) 09/01/2025 12/17/2019    Hemoglobin A1c (Diabetic Prevention Screening) 07/10/2027 7/10/2024    Colorectal Cancer Screening 09/17/2027 1/21/2022    TETANUS VACCINE 12/17/2029 12/17/2019    Lipid Panel 07/02/2030 7/2/2025             Past Medical History:   Diagnosis Date    Allergy     Anemia     Hypertension     Mild aortic valve stenosis     Prostate cancer     Systolic murmur         Past Surgical History:   Procedure Laterality Date    LEFT HEART CATHETERIZATION N/A 4/18/2024    Procedure: Left heart cath;  Surgeon: Janessa Voss MD;  Location: [facility] CATH LAB;  Service: Cardiology;  Laterality: N/A;    PHLEBECTOMY Left     LEG    PROSTATE SURGERY          Social History     Socioeconomic History    Marital  status:    Tobacco Use    Smoking status: Former     Current packs/day: 0.00     Types: Cigarettes     Start date:      Quit date:      Years since quittin.5     Passive exposure: Past    Smokeless tobacco: Never   Substance and Sexual Activity    Alcohol use: Yes     Alcohol/week: 15.0 standard drinks of alcohol     Types: 15 Cans of beer per week     Comment: 2-3 beers 4-5 days weekly    Drug use: Not Currently     Types: Marijuana    Sexual activity: Yes     Partners: Female     Social Drivers of Health     Financial Resource Strain: Low Risk  (2025)    Overall Financial Resource Strain (CARDIA)     Difficulty of Paying Living Expenses: Not hard at all   Food Insecurity: No Food Insecurity (2025)    Hunger Vital Sign     Worried About Running Out of Food in the Last Year: Never true     Ran Out of Food in the Last Year: Never true   Transportation Needs: No Transportation Needs (2025)    PRAPARE - Transportation     Lack of Transportation (Medical): No     Lack of Transportation (Non-Medical): No   Physical Activity: Insufficiently Active (2025)    Exercise Vital Sign     Days of Exercise per Week: 4 days     Minutes of Exercise per Session: 30 min   Stress: No Stress Concern Present (2025)    [employer] [place] [employer] - Occupational Stress Questionnaire     Feeling of Stress : Only a little   Housing Stability: Low Risk  (2025)    Housing Stability Vital Sign     Unable to Pay for Housing in the Last Year: No     Number of Times Moved in the Last Year: 0     Homeless in the Last Year: No        Family History   Problem Relation Name Age of Onset    Heart attack Mother      Heart attack Father      Cancer Brother          Current Outpatient Medications   Medication Instructions    EPINEPHrine (EPIPEN) 0.3 mg/0.3 mL AtIn SMARTSI pre-filled pen syringe IM Daily PRN    lisinopriL 10 mg, Daily    rosuvastatin (CRESTOR) 10 mg, Nightly    tadalafiL  "(CIALIS) 10 mg, Daily PRN    UNABLE TO FIND Weekly    varicella-zoster gE-AS01B, PF, (SHINGRIX, PF,) 50 mcg/0.5 mL injection 0.5 mLs, Intramuscular, Once       Review of patient's allergies indicates:  No Known Allergies     Immunization History   Administered Date(s) Administered    Influenza 12/08/2015, 12/08/2016, 12/10/2018, 12/17/2019    Influenza - Trivalent - Afluria, Fluzone MDV 10/30/2009    Influenza - Trivalent - Fluarix, Flulaval, Fluzone, Afluria - PF 12/08/2015, 12/08/2016, 12/07/2017, 12/10/2018, 12/17/2019    Pneumococcal Conjugate - 20 Valent 07/11/2023    Tdap 12/17/2019        Patient Care Team:  Christianne Michael FNP as PCP - General (Family Medicine)  Ambrose Rome MD as Consulting Physician (Gastroenterology)  Napoleon Tobar MD as Consulting Physician (Urology)  [facility] [place] [facility] (Cardiovascular Disease)  Janessa Voss MD as Consulting Physician (Cardiology)    Subjective:     Review of Systems   Constitutional: Negative.    HENT: Negative.     Eyes: Negative.    Respiratory: Negative.     Cardiovascular: Negative.    Gastrointestinal: Negative.    Endocrine: Negative.    Genitourinary: Negative.    Musculoskeletal: Negative.    Skin: Negative.    Allergic/Immunologic: Negative.    Neurological: Negative.    Hematological: Negative.    Psychiatric/Behavioral: Negative.     All other systems reviewed and are negative.      12 point review of systems conducted, negative except as stated in the history of present illness. See HPI for details.    Objective:     Visit Vitals  /70 (BP Location: Left arm)   Pulse 74   Resp 16   Ht 5' 10" (1.778 m)   Wt 88.8 kg (195 lb 12.8 oz)   SpO2 96%   BMI 28.09 kg/m²       Physical Exam  Vitals and nursing note reviewed.   Constitutional:       Appearance: Normal appearance. He is normal weight.   HENT:      Head: Normocephalic and atraumatic.      Right Ear: Tympanic membrane, ear canal and external ear " normal.      Left Ear: Tympanic membrane, ear canal and external ear normal.      Nose: Nose normal.      Mouth/Throat:      Mouth: Mucous membranes are moist.      Pharynx: Oropharynx is clear.   Eyes:      Extraocular Movements: Extraocular movements intact.      Conjunctiva/sclera: Conjunctivae normal.      Pupils: Pupils are equal, round, and reactive to light.   Cardiovascular:      Rate and Rhythm: Normal rate and regular rhythm.      Pulses: Normal pulses.      Heart sounds: Murmur heard.   Pulmonary:      Effort: Pulmonary effort is normal.      Breath sounds: Normal breath sounds.   Abdominal:      General: Abdomen is flat. Bowel sounds are normal.      Palpations: Abdomen is soft.   Musculoskeletal:         General: Normal range of motion.      Cervical back: Normal range of motion and neck supple.   Skin:     General: Skin is warm and dry.   Neurological:      General: No focal deficit present.      Mental Status: He is alert and oriented to person, place, and time.   Psychiatric:         Mood and Affect: Mood normal.         Behavior: Behavior normal.         Thought Content: Thought content normal.         Judgment: Judgment normal.         Assessment:       ICD-10-CM ICD-9-CM   1. Medicare annual wellness visit, subsequent  Z00.00 V70.0   2. Advanced care planning/counseling discussion  Z71.89 V65.49   3. History of prostate cancer  Z85.46 V10.46   4. Primary hypertension  I10 401.9   5. Mixed hyperlipidemia  E78.2 272.2   6. Nonrheumatic aortic valve stenosis  I35.0 424.1   7. Anemia, unspecified type  D64.9 285.9   8. Need for shingles vaccine  Z23 V04.89        Plan:     Assessment & Plan    Z00.00 Medicare annual wellness visit, subsequent  Z71.89 Advanced care planning/counseling discussion  Z85.46 History of prostate cancer  I10 Primary hypertension  E78.2 Mixed hyperlipidemia  I35.0 Nonrheumatic aortic valve stenosis  D64.9 Anemia, unspecified type  Z23 Need for shingles  vaccine    IMPRESSION:  - Reviewed recent cardiology and urology reports.  - Assessed mild, chronic anemia; determined CBC not immediately necessary given asymptomatic status.  - Confirmed existence of advance directive and healthcare power of ; educated on importance; patient to bring copy to next appointment for inclusion in medical record.    MEDICARE ANNUAL WELLNESS VISIT, SUBSEQUENT:  - Ordered comprehensive annual labs for next year to be completed at [facility] prior to next year's appointments with cardiology, urology, and primary care.  - Follow up in 1 year for Medicare wellness visit and to review annual lab results.    HISTORY OF PROSTATE CANCER:  - Will monitor with diagnostic PSA as part of the annual lab panel.    MIXED HYPERLIPIDEMIA:  - Lipid panel included in the annual lab workup.    ANEMIA, UNSPECIFIED TYPE:  - CBC included in the annual lab workup.    NEED FOR SHINGLES VACCINE:  - Started Shingrix vaccine (shingles vaccine), 2-dose series with 2nd dose to be administered 2-6 months after the 1st; explained potential side effects including fatigue, particularly after 2nd dose.          1. Medicare annual wellness visit, subsequent  Overview:  Medicare annual wellness visit yearly in July      2. Advanced care planning/counseling discussion  Assessment & Plan:  Discussed advanced care planning, patient reports he does have healthcare power-of- and living will at home.  Encouraged to bring copies into the office to scan into chart.      3. History of prostate cancer  Overview:  Dr. Tobar  Diagnosed 12/11/2014, PSA at time of diagnosis 9.79  02/18/2015 - radical prostatectomy    Orders:  -     PSA, Total (Diagnostic); Future; Expected date: 07/28/2026    4. Primary hypertension  Overview:  Lisinopril 10 mg daily    Assessment & Plan:  Stable, continue lisinopril 10 mg daily, follow-up 1 year.    Orders:  -     Comprehensive Metabolic Panel; Future; Expected date:  07/28/2026    5. Mixed hyperlipidemia  Overview:  Rosuvastatin 10 mg daily    Orders:  -     Comprehensive Metabolic Panel; Future; Expected date: 07/28/2026  -     Lipid Panel; Future; Expected date: 07/28/2026    6. Nonrheumatic aortic valve stenosis  Overview:  Previously followed by Dr. Cortez  01/09/2018 - TTE - calcification of the right non coronary aortic valve leaflets without aortic stenosis, aortic valve appears to be tricuspid  07/14/2023 - TTE at [facility] showing moderate to severe aortic stenosis  07/17/2023 - refer to [facility] [place]  02/05/2024 - TTE per [facility], LVEF 65%, severe aortic stenosis   04/18/2024 - [facility] per Dr Voss - mild coronary artery disease with severe aortic stenosis with recommended AVR   05/01/2024 - visit with Dr Bell to discuss AVR, plan to monitor per patient request     Assessment & Plan:  Continue yearly monitoring per Dr. Voss.      7. Anemia, unspecified type  -     CBC Auto Differential; Future; Expected date: 07/28/2026    8. Need for shingles vaccine  -     varicella-zoster gE-AS01B, PF, (SHINGRIX, PF,) 50 mcg/0.5 mL injection; Inject 0.5 mLs into the muscle once. for 1 dose  Dispense: 1 each; Refill: 1         A comprehensive HEALTH RISK ASSESSMENT was completed today. Results are summarized below:    There are NO EMOTIONAL/SOCIAL CONCERNS identified on today's screening for Social Isolation, Depression and Anxiety.    There are NO COGNITIVE FUNCTION CONCERNS identified on today's screening.  There are NO FUNCTIONAL OR SAFETY CONCERNS were identified on today's screening for Physical Symptoms, Nutritional, Cognitive Function, Home Safety/Living Situation, Fall Risk, Activities of Daily Living, Independent Activities of Daily Living, Physical Activity, Timed Up and Go test and Whisper test.   The patient reports NO OPIOID PRESCRIPTIONS. This was confirmed through medication reconciliation.    The patient is NOT A TOBACCO USER.  The patient  reports NO SIGNIFICANT ALCOHOL USE.     All Questions regarding food, transportation or housing were not answered today.    The patient was asked and declined the use of a free .    Advance Care Planning     Date: 07/28/2025    Power of   I initiated the process of voluntary advance care planning today and explained the importance of this process to the patient.  I introduced the concept of advance directives to the patient, as well. Then the patient received detailed information about the importance of designating a Health Care Power of  (HCPOA). He was also instructed to communicate with this person about their wishes for future healthcare, should he become sick and lose decision-making capacity. The patient has previously appointed a HCPOA. After our discussion, the patient has not decided to complete a HCPOA and has appointed his significant other, health care agent: Mariah Hutchins & health care agent number: [phone]. I encouraged him to communicate with this person about their wishes for future healthcare, should he become sick and lose decision-making capacity.      A total of 20 min was spent on advance care planning, goals of care discussion, emotional support, formulating and communicating prognosis and exploring burden/benefit of various approaches of treatment. This discussion occurred on a fully voluntary basis with the verbal consent of the patient and/or family.         Provided patient with a 5-10 year written screening schedule and personal prevention plan. Recommendations were developed using the USPSTF age appropriate recommendations. Education, counseling, and referrals were provided as needed. After Visit Summary printed and given to patient, which includes a list of additional screenings\tests needed.    Follow up in about 1 year (around 7/28/2026) for Annual. In addition to their scheduled follow up, the patient has also been instructed to follow up on as needed  basis.     Future Appointments   Date Time Provider Department Center   7/29/2026  8:20 AM Christianne Michael FNP [facility]        [place] [place]

## 2025-07-28 NOTE — LETTER
AUTHORIZATION FOR RELEASE OF   CONFIDENTIAL INFORMATION    Dear Dr. Tobar,    We are seeing Amadeo Hutchins, date of birth 1960, in the clinic at [facility]. Christianne Michael FNP is the patient's PCP. Amadeo Hutchins has an outstanding lab/procedure at the time we reviewed his chart. In order to help keep his health information updated, he has authorized us to request the following medical record(s):        (  )  MAMMOGRAM                                      (  )  COLONOSCOPY      (  )  PAP SMEAR                                          ( X )  PSA RESULTS     (  )  DEXA SCAN                                          (  )  EYE EXAM            (  )  FOOT EXAM                                          (  )  ENTIRE RECORD     (  )  OUTSIDE IMMUNIZATIONS                 (x  ) MOST RECENT OFFICE NOTE     Please fax records to [facility], [place], Kathryn F., FNP, [phone]     If you have any questions, please contact [phone]          Patient Name: Amadeo Hutchins  : 1960  Patient Phone #: [phone]

## 2025-08-11 ENCOUNTER — DOCUMENTATION ONLY (OUTPATIENT)
Dept: FAMILY MEDICINE | Facility: CLINIC | Age: 65
End: 2025-08-11
Payer: MEDICARE

## (undated) DEVICE — Device

## (undated) DEVICE — GLOVE 7.5 PROTEXIS PI MICRO

## (undated) DEVICE — CONTRAST ISOVUE 370 500ML MULT

## (undated) DEVICE — BAND TR WITH INFLATOR

## (undated) DEVICE — PACK OR CLEAN UP COMBO SIZE 2

## (undated) DEVICE — KIT GLIDESHEATH SLEND 6FR 10CM

## (undated) DEVICE — COVER PROBE US 5.5X58L NON LTX

## (undated) DEVICE — KIT MANIFOLD LOW PRESS TUBING

## (undated) DEVICE — CATH OPTITORQUE RADIAL 5FR

## (undated) DEVICE — KIT HAND CONTROL HIGH PRESSUR

## (undated) DEVICE — TUBING HP AIRLSS ROT ADPT 30IN

## (undated) DEVICE — GUIDEWIRE EMERALD .035IN 260CM

## (undated) DEVICE — DRAPE ANGIO BRACH 38X44IN

## (undated) DEVICE — PAD DEFIB CADENCE ADULT R2

## (undated) DEVICE — CANNULA DUAL CO2/O2 NASAL 7FT